# Patient Record
Sex: FEMALE | Race: WHITE | NOT HISPANIC OR LATINO | URBAN - METROPOLITAN AREA
[De-identification: names, ages, dates, MRNs, and addresses within clinical notes are randomized per-mention and may not be internally consistent; named-entity substitution may affect disease eponyms.]

---

## 2021-07-26 ENCOUNTER — LAB (OUTPATIENT)
Dept: LAB | Facility: HOSPITAL | Age: 76
End: 2021-07-26

## 2021-07-26 ENCOUNTER — OFFICE VISIT (OUTPATIENT)
Dept: ENDOCRINOLOGY | Facility: CLINIC | Age: 76
End: 2021-07-26

## 2021-07-26 VITALS
HEIGHT: 65 IN | HEART RATE: 88 BPM | SYSTOLIC BLOOD PRESSURE: 118 MMHG | DIASTOLIC BLOOD PRESSURE: 64 MMHG | OXYGEN SATURATION: 98 % | BODY MASS INDEX: 15.99 KG/M2 | WEIGHT: 96 LBS

## 2021-07-26 DIAGNOSIS — E23.0 PANHYPOPITUITARISM (HCC): Primary | ICD-10-CM

## 2021-07-26 DIAGNOSIS — E23.0 PANHYPOPITUITARISM (HCC): ICD-10-CM

## 2021-07-26 PROCEDURE — 99204 OFFICE O/P NEW MOD 45 MIN: CPT | Performed by: INTERNAL MEDICINE

## 2021-07-26 PROCEDURE — 84439 ASSAY OF FREE THYROXINE: CPT

## 2021-07-26 PROCEDURE — 80053 COMPREHEN METABOLIC PANEL: CPT

## 2021-07-26 RX ORDER — ATORVASTATIN CALCIUM 10 MG/1
10 TABLET, FILM COATED ORAL DAILY
COMMUNITY
End: 2022-08-24

## 2021-07-26 RX ORDER — OMEPRAZOLE 20 MG/1
40 CAPSULE, DELAYED RELEASE ORAL DAILY
COMMUNITY
End: 2022-02-14

## 2021-07-26 RX ORDER — LEVOTHYROXINE SODIUM 0.05 MG/1
TABLET ORAL
COMMUNITY
Start: 2021-04-22 | End: 2021-07-27 | Stop reason: SDUPTHER

## 2021-07-26 RX ORDER — HYDROCODONE BITARTRATE AND ACETAMINOPHEN 5; 325 MG/1; MG/1
1 TABLET ORAL EVERY 6 HOURS PRN
COMMUNITY
End: 2022-09-01

## 2021-07-26 RX ORDER — ERGOCALCIFEROL 1.25 MG/1
50000 CAPSULE ORAL WEEKLY
COMMUNITY

## 2021-07-26 RX ORDER — HYDROCORTISONE 10 MG/1
TABLET ORAL
COMMUNITY
Start: 2021-04-20 | End: 2021-07-27

## 2021-07-26 NOTE — PROGRESS NOTES
"     Office Note      Date: 2021  Patient Name: Anahi Tirado  MRN: [id number]  : 1945    Chief Complaint   Patient presents with   • Pituitary Problem       History of Present Illness:   Anahi Tirado is a 76 y.o. female who presents for Pituitary Problem  she is seen today as a new patient.  ------------------------------------------------  Back in the 's she had a resection of a large pituitary tumor. Followed by XRT.  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  .  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    She is not known to have DI and denies excessive urination or excessive thirst.  She struggles with her weight and fatigue.  She is so weak she can barely go.    Hair is getting thinner.      Subjective          Review of Systems:   Review of Systems   Constitutional: Positive for activity change, fatigue and unexpected weight change.   Eyes: Positive for visual disturbance.   Genitourinary: Positive for difficulty urinating.   Skin: Negative.    Neurological: Positive for weakness.   Psychiatric/Behavioral: Positive for sleep disturbance.       The following portions of the patient's history were reviewed and updated as appropriate: allergies, current medications, past family history, past medical history, past social history, past surgical history and problem list.    Objective     Visit Vitals  /64 (BP Location: Right arm, Patient Position: Sitting, Cuff Size: Adult)   Pulse 88   Ht 165.1 cm (65\")   Wt 43.5 kg (96 lb)   SpO2 98%   BMI 15.98 kg/m²       L    Physical Exam:  Physical Exam  Vitals reviewed.   Constitutional:       Comments: Abnormally thin .    HENT:      Head:      Comments: Dysmorphic right eye      Mouth/Throat:      Mouth: Mucous membranes are moist.   Eyes:      Extraocular Movements: Extraocular movements intact.      Comments: Right eye is abnormal  "   Neurological:      Mental Status: She is alert.      Comments: Limited movement of right side of face          Assessment / Plan      Assessment & Plan:  Problem List Items Addressed This Visit        Other    Panhypopituitarism (CMS/HCC) - Primary    Overview     Central hypot4. So follow t4 not tsh  Central adrenal deficiency- follow cmp, wt, bp         Current Assessment & Plan     Will check ft4 and cmp  Will adjust meds  kajal likely change to prednisone as she did better with that in past         Relevant Orders    Comprehensive Metabolic Panel    T4, Free           Leon Toscano MD   07/26/2021

## 2021-07-26 NOTE — ASSESSMENT & PLAN NOTE
Will check ft4 and cmp  Will adjust meds  kajal likely change to prednisone as she did better with that in past

## 2021-07-27 DIAGNOSIS — E23.0 PANHYPOPITUITARISM (HCC): Primary | ICD-10-CM

## 2021-07-27 LAB
ALBUMIN SERPL-MCNC: 4 G/DL (ref 3.5–5.2)
ALBUMIN/GLOB SERPL: 1.7 G/DL
ALP SERPL-CCNC: 53 U/L (ref 39–117)
ALT SERPL W P-5'-P-CCNC: 7 U/L (ref 1–33)
ANION GAP SERPL CALCULATED.3IONS-SCNC: 9.6 MMOL/L (ref 5–15)
AST SERPL-CCNC: 14 U/L (ref 1–32)
BILIRUB SERPL-MCNC: 0.5 MG/DL (ref 0–1.2)
BUN SERPL-MCNC: 12 MG/DL (ref 8–23)
BUN/CREAT SERPL: 16.7 (ref 7–25)
CALCIUM SPEC-SCNC: 8.7 MG/DL (ref 8.6–10.5)
CHLORIDE SERPL-SCNC: 103 MMOL/L (ref 98–107)
CO2 SERPL-SCNC: 28.4 MMOL/L (ref 22–29)
CREAT SERPL-MCNC: 0.72 MG/DL (ref 0.57–1)
GFR SERPL CREATININE-BSD FRML MDRD: 78 ML/MIN/1.73
GLOBULIN UR ELPH-MCNC: 2.3 GM/DL
GLUCOSE SERPL-MCNC: 86 MG/DL (ref 65–99)
POTASSIUM SERPL-SCNC: 3.8 MMOL/L (ref 3.5–5.2)
PROT SERPL-MCNC: 6.3 G/DL (ref 6–8.5)
SODIUM SERPL-SCNC: 141 MMOL/L (ref 136–145)
T4 FREE SERPL-MCNC: 1.72 NG/DL (ref 0.93–1.7)

## 2021-07-27 RX ORDER — PREDNISONE 1 MG/1
TABLET ORAL
Qty: 135 TABLET | Refills: 3 | Status: SHIPPED | OUTPATIENT
Start: 2021-07-27

## 2021-07-27 RX ORDER — LEVOTHYROXINE SODIUM 0.05 MG/1
TABLET ORAL
Qty: 72 TABLET | Refills: 3 | Status: SHIPPED | OUTPATIENT
Start: 2021-07-27 | End: 2022-05-25 | Stop reason: SDUPTHER

## 2021-11-03 ENCOUNTER — OFFICE VISIT (OUTPATIENT)
Dept: ENDOCRINOLOGY | Facility: CLINIC | Age: 76
End: 2021-11-03

## 2021-11-03 VITALS
SYSTOLIC BLOOD PRESSURE: 128 MMHG | HEIGHT: 65 IN | BODY MASS INDEX: 18.49 KG/M2 | DIASTOLIC BLOOD PRESSURE: 82 MMHG | OXYGEN SATURATION: 97 % | WEIGHT: 111 LBS | HEART RATE: 85 BPM

## 2021-11-03 DIAGNOSIS — E23.0 PANHYPOPITUITARISM (HCC): ICD-10-CM

## 2021-11-03 PROCEDURE — 99213 OFFICE O/P EST LOW 20 MIN: CPT | Performed by: INTERNAL MEDICINE

## 2021-11-03 NOTE — ASSESSMENT & PLAN NOTE
She is doing better. I don't need any labs today  Her recovery is fragile and we need to keep a close eye on her so will recheck in a couple of months

## 2021-11-03 NOTE — PROGRESS NOTES
"     Office Note      Date: 2021  Patient Name: Anahi Tirado  MRN: [id number]  : 1945    Chief Complaint   Patient presents with   • panhypopituitarism       History of Present Illness:   Anahi Tirado is a 76 y.o. female who presents for panhypopituitarism  last time, I changed her back to prednisone which seems to work better for her. She has gained 15 pounds and is pleased with that- she had lost 50 pounds and felt really poorly  There has been no change to her medical history.  Some mornings she wakes up nauseated.  Her pcp increased her prilosec to 40 bid.  -----------------------  Her bp has been better and overall she feels better     Subjective          Review of Systems:   Review of Systems   Constitutional: Negative.        The following portions of the patient's history were reviewed and updated as appropriate: allergies, current medications, past family history, past medical history, past social history, past surgical history and problem list.    Objective     Visit Vitals  /82 (BP Location: Left arm, Patient Position: Sitting, Cuff Size: Adult)   Pulse 85   Ht 165.1 cm (65\")   Wt 50.3 kg (111 lb)   SpO2 97%   BMI 18.47 kg/m²       Labs:    CBC w/DIFF  No results found for: WBC, RBC, HGB, HCT, MCV, MCH, MCHC, RDW, RDWSD, MPV, PLT, NEUTRORELPCT, LYMPHORELPCT, MONORELPCT, EOSRELPCT, BASORELPCT, AUTOIGPER, NEUTROABS, LYMPHSABS, MONOSABS, EOSABS, BASOSABS, AUTOIGNUM, NRBC    T4  Free T4   Date Value Ref Range Status   2021 1.72 (H) 0.93 - 1.70 ng/dL Final       TSH  No results found for: TSHBASE     Physical Exam:  Physical Exam  Vitals reviewed.   Constitutional:       Appearance: Normal appearance.   Neurological:      Mental Status: She is alert.         Assessment / Plan      Assessment & Plan:  Problem List Items Addressed This Visit        Other    Panhypopituitarism (HCC)    Overview     Central hypot4. So follow t4 not tsh  Central adrenal deficiency- follow cmp, wt, " bp         Current Assessment & Plan     She is doing better. I don't need any labs today  Her recovery is fragile and we need to keep a close eye on her so will recheck in a couple of months          Relevant Medications    levothyroxine (SYNTHROID, LEVOTHROID) 50 MCG tablet    predniSONE (DELTASONE) 5 MG tablet           Leon Toscano MD   11/03/2021

## 2022-02-14 ENCOUNTER — LAB (OUTPATIENT)
Dept: LAB | Facility: HOSPITAL | Age: 77
End: 2022-02-14

## 2022-02-14 ENCOUNTER — OFFICE VISIT (OUTPATIENT)
Dept: ENDOCRINOLOGY | Facility: CLINIC | Age: 77
End: 2022-02-14

## 2022-02-14 VITALS
BODY MASS INDEX: 19.49 KG/M2 | HEART RATE: 74 BPM | WEIGHT: 117 LBS | HEIGHT: 65 IN | SYSTOLIC BLOOD PRESSURE: 110 MMHG | OXYGEN SATURATION: 97 % | DIASTOLIC BLOOD PRESSURE: 61 MMHG

## 2022-02-14 DIAGNOSIS — E23.0 PANHYPOPITUITARISM: ICD-10-CM

## 2022-02-14 DIAGNOSIS — E23.0 PANHYPOPITUITARISM: Primary | ICD-10-CM

## 2022-02-14 PROCEDURE — 84439 ASSAY OF FREE THYROXINE: CPT

## 2022-02-14 PROCEDURE — 99213 OFFICE O/P EST LOW 20 MIN: CPT | Performed by: INTERNAL MEDICINE

## 2022-02-14 PROCEDURE — 80053 COMPREHEN METABOLIC PANEL: CPT

## 2022-02-14 RX ORDER — ONDANSETRON 4 MG/1
TABLET, ORALLY DISINTEGRATING ORAL
COMMUNITY
Start: 2022-02-08 | End: 2022-09-01

## 2022-02-14 RX ORDER — POTASSIUM CHLORIDE 20 MEQ/1
TABLET, EXTENDED RELEASE ORAL
COMMUNITY
Start: 2022-02-08 | End: 2022-09-01

## 2022-02-14 RX ORDER — LOPERAMIDE HYDROCHLORIDE 2 MG/1
CAPSULE ORAL
COMMUNITY
Start: 2022-02-08 | End: 2022-09-01

## 2022-02-14 NOTE — PROGRESS NOTES
"     Office Note      Date: 2022  Patient Name: Anahi Tirado  MRN: [id number]  : 1945    Cc: pituitary follow up   History of Present Illness:   Anahi Tirado is a 76 y.o. female who presents for follow up of central pituitary disease from pituitary surgery in the 's  She went to the er with bad diarrhea last week  She had low K then as well.   She had been feeling better but now notes she is more week.  ----  She has not had excessive thirst.    Subjective        Review of Systems:   Review of Systems   Constitutional: Negative.    HENT: Negative.    Eyes: Negative.    Respiratory: Negative.        The following portions of the patient's history were reviewed and updated as appropriate: allergies, current medications, past family history, past medical history, past social history, past surgical history and problem list.    Objective     Visit Vitals  /61   Pulse 74   Ht 165.1 cm (65\")   Wt 53.1 kg (117 lb)   SpO2 97%   BMI 19.47 kg/m²       Labs:    CBC w/DIFF  No results found for: WBC, RBC, HGB, HCT, MCV, MCH, MCHC, RDW, RDWSD, MPV, PLT, NEUTRORELPCT, LYMPHORELPCT, MONORELPCT, EOSRELPCT, BASORELPCT, AUTOIGPER, NEUTROABS, LYMPHSABS, MONOSABS, EOSABS, BASOSABS, AUTOIGNUM, NRBC    T4  Free T4   Date Value Ref Range Status   2021 1.72 (H) 0.93 - 1.70 ng/dL Final       TSH  No results found for: TSHBASE     Physical Exam:  Physical Exam  Vitals reviewed.   Constitutional:       Appearance: Normal appearance.   HENT:      Head: Normocephalic and atraumatic.   Musculoskeletal:      Comments: Bruising left antecubital fossae   Neurological:      Mental Status: She is alert.   Psychiatric:         Mood and Affect: Mood normal.         Thought Content: Thought content normal.         Judgment: Judgment normal.         Assessment / Plan      Assessment & Plan:  Problem List Items Addressed This Visit        Other    Panhypopituitarism (HCC) - Primary    Overview     Central hypot4. 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 Except for the recent diarrhea.   Will check free t4 and recheck her potassium         Relevant Medications    levothyroxine (SYNTHROID, LEVOTHROID) 50 MCG tablet    predniSONE (DELTASONE) 5 MG tablet    Other Relevant Orders    T4, Free    Comprehensive Metabolic Panel           Leon Toscano MD   02/14/2022

## 2022-02-15 LAB
ALBUMIN SERPL-MCNC: 4 G/DL (ref 3.5–5.2)
ALBUMIN/GLOB SERPL: 1.9 G/DL
ALP SERPL-CCNC: 54 U/L (ref 39–117)
ALT SERPL W P-5'-P-CCNC: 9 U/L (ref 1–33)
ANION GAP SERPL CALCULATED.3IONS-SCNC: 11 MMOL/L (ref 5–15)
AST SERPL-CCNC: 16 U/L (ref 1–32)
BILIRUB SERPL-MCNC: 0.4 MG/DL (ref 0–1.2)
BUN SERPL-MCNC: 11 MG/DL (ref 8–23)
BUN/CREAT SERPL: 11.1 (ref 7–25)
CALCIUM SPEC-SCNC: 8.8 MG/DL (ref 8.6–10.5)
CHLORIDE SERPL-SCNC: 106 MMOL/L (ref 98–107)
CO2 SERPL-SCNC: 25 MMOL/L (ref 22–29)
CREAT SERPL-MCNC: 0.99 MG/DL (ref 0.57–1)
GFR SERPL CREATININE-BSD FRML MDRD: 54 ML/MIN/1.73
GLOBULIN UR ELPH-MCNC: 2.1 GM/DL
GLUCOSE SERPL-MCNC: 89 MG/DL (ref 65–99)
POTASSIUM SERPL-SCNC: 4.4 MMOL/L (ref 3.5–5.2)
PROT SERPL-MCNC: 6.1 G/DL (ref 6–8.5)
SODIUM SERPL-SCNC: 142 MMOL/L (ref 136–145)
T4 FREE SERPL-MCNC: 1.45 NG/DL (ref 0.93–1.7)

## 2022-03-21 ENCOUNTER — TELEPHONE (OUTPATIENT)
Dept: ENDOCRINOLOGY | Facility: CLINIC | Age: 77
End: 2022-03-21

## 2022-03-21 NOTE — TELEPHONE ENCOUNTER
PATIENT WOULD LIKE A CALL BACK TO DISCUSS DOSAGE ON SYNTHROID. SHE STATES BACK IN 1995 THE DOSAGE OF SYNTHROID WAS 0.75 MCG. SHE IS CURRENTLY TAKING 50 MCG. PATIENTS NUMBER [phone]

## 2022-03-22 NOTE — TELEPHONE ENCOUNTER
Spoke with patient.  She states that Dr. Toscano wanted to know what dose she had been on previously.  She states she was originally on 0.75mcg back in 1995.

## 2022-05-25 DIAGNOSIS — E23.0 PANHYPOPITUITARISM: ICD-10-CM

## 2022-05-25 RX ORDER — LEVOTHYROXINE SODIUM 0.05 MG/1
TABLET ORAL
Qty: 72 TABLET | Refills: 1 | Status: SHIPPED | OUTPATIENT
Start: 2022-05-25

## 2022-08-24 ENCOUNTER — OFFICE VISIT (OUTPATIENT)
Dept: ENDOCRINOLOGY | Facility: CLINIC | Age: 77
End: 2022-08-24

## 2022-08-24 ENCOUNTER — LAB (OUTPATIENT)
Dept: LAB | Facility: HOSPITAL | Age: 77
End: 2022-08-24

## 2022-08-24 VITALS
OXYGEN SATURATION: 98 % | HEIGHT: 65 IN | HEART RATE: 89 BPM | DIASTOLIC BLOOD PRESSURE: 64 MMHG | WEIGHT: 122 LBS | BODY MASS INDEX: 20.33 KG/M2 | SYSTOLIC BLOOD PRESSURE: 110 MMHG

## 2022-08-24 DIAGNOSIS — E23.0 PANHYPOPITUITARISM: ICD-10-CM

## 2022-08-24 DIAGNOSIS — E23.0 PANHYPOPITUITARISM: Primary | ICD-10-CM

## 2022-08-24 DIAGNOSIS — D50.8 OTHER IRON DEFICIENCY ANEMIA: ICD-10-CM

## 2022-08-24 PROBLEM — D50.9 IRON DEFICIENCY ANEMIA: Status: ACTIVE | Noted: 2022-08-24

## 2022-08-24 LAB
ALBUMIN SERPL-MCNC: 3.6 G/DL (ref 3.5–5.2)
ALBUMIN/GLOB SERPL: 1.7 G/DL
ALP SERPL-CCNC: 54 U/L (ref 39–117)
ALT SERPL W P-5'-P-CCNC: 40 U/L (ref 1–33)
ANION GAP SERPL CALCULATED.3IONS-SCNC: 13.7 MMOL/L (ref 5–15)
AST SERPL-CCNC: 36 U/L (ref 1–32)
BILIRUB SERPL-MCNC: 0.5 MG/DL (ref 0–1.2)
BUN SERPL-MCNC: 16 MG/DL (ref 8–23)
BUN/CREAT SERPL: 16.5 (ref 7–25)
CALCIUM SPEC-SCNC: 8.8 MG/DL (ref 8.6–10.5)
CHLORIDE SERPL-SCNC: 103 MMOL/L (ref 98–107)
CO2 SERPL-SCNC: 27.3 MMOL/L (ref 22–29)
CREAT SERPL-MCNC: 0.97 MG/DL (ref 0.57–1)
DEPRECATED RDW RBC AUTO: 49.7 FL (ref 37–54)
EGFRCR SERPLBLD CKD-EPI 2021: 59.6 ML/MIN/1.73
ERYTHROCYTE [DISTWIDTH] IN BLOOD BY AUTOMATED COUNT: 15.3 % (ref 12.3–15.4)
GLOBULIN UR ELPH-MCNC: 2.1 GM/DL
GLUCOSE SERPL-MCNC: 196 MG/DL (ref 65–99)
HCT VFR BLD AUTO: 34.3 % (ref 34–46.6)
HGB BLD-MCNC: 11.1 G/DL (ref 12–15.9)
MCH RBC QN AUTO: 29.1 PG (ref 26.6–33)
MCHC RBC AUTO-ENTMCNC: 32.4 G/DL (ref 31.5–35.7)
MCV RBC AUTO: 89.8 FL (ref 79–97)
PLATELET # BLD AUTO: 222 10*3/MM3 (ref 140–450)
PMV BLD AUTO: 9.7 FL (ref 6–12)
POTASSIUM SERPL-SCNC: 3.4 MMOL/L (ref 3.5–5.2)
PROT SERPL-MCNC: 5.7 G/DL (ref 6–8.5)
RBC # BLD AUTO: 3.82 10*6/MM3 (ref 3.77–5.28)
SODIUM SERPL-SCNC: 144 MMOL/L (ref 136–145)
WBC NRBC COR # BLD: 10.13 10*3/MM3 (ref 3.4–10.8)

## 2022-08-24 PROCEDURE — 80053 COMPREHEN METABOLIC PANEL: CPT

## 2022-08-24 PROCEDURE — 84439 ASSAY OF FREE THYROXINE: CPT

## 2022-08-24 PROCEDURE — 99214 OFFICE O/P EST MOD 30 MIN: CPT | Performed by: INTERNAL MEDICINE

## 2022-08-24 PROCEDURE — 85027 COMPLETE CBC AUTOMATED: CPT

## 2022-08-24 PROCEDURE — 36415 COLL VENOUS BLD VENIPUNCTURE: CPT

## 2022-08-24 RX ORDER — ATORVASTATIN CALCIUM 20 MG/1
TABLET, FILM COATED ORAL
COMMUNITY
Start: 2022-08-19

## 2022-08-24 NOTE — PROGRESS NOTES
"     Office Note      Date: 2022  Patient Name: Anahi Tirado  MRN: [id number]  : 1945    Chief Complaint   Patient presents with   • panhypopituitarism       History of Present Illness:   Anahi Tirado is a 77 y.o. female who presents for panhypopituitarism  she is on levothyroxine and chronic steroids and is here for routine follow up.  --------------------  She notes lack of energy.. this has been going on for a while.  She is not having chest pain or shortness of breath.   Weight has been climbing slowly.  ---------------------  She notes decreased exercise tolerance.    She reports that none of this is new and has been this way a long time         Subjective      .    Review of Systems:   Review of Systems   Constitutional: Positive for fatigue and unexpected weight change.   Eyes: Positive for visual disturbance.   Endocrine: Negative for cold intolerance and heat intolerance.   Psychiatric/Behavioral: Negative for sleep disturbance.       The following portions of the patient's history were reviewed and updated as appropriate: allergies, current medications, past family history, past medical history, past social history, past surgical history and problem list.    Objective     Visit Vitals  /64   Pulse 89   Ht 165.1 cm (65\")   Wt 55.3 kg (122 lb)   SpO2 98%   BMI 20.30 kg/m²       Labs:    CBC w/DIFF  No results found for: WBC, RBC, HGB, HCT, MCV, MCH, MCHC, RDW, RDWSD, MPV, PLT, NEUTRORELPCT, LYMPHORELPCT, MONORELPCT, EOSRELPCT, BASORELPCT, AUTOIGPER, NEUTROABS, LYMPHSABS, MONOSABS, EOSABS, BASOSABS, AUTOIGNUM, NRBC    T4  Free T4   Date Value Ref Range Status   2022 1.45 0.93 - 1.70 ng/dL Final       TSH  No results found for: TSHBASE     Physical Exam:  Physical Exam  Vitals reviewed.   Constitutional:       Appearance: Normal appearance.      Comments: pale   Neurological:      Mental Status: She is alert.   Psychiatric:         Mood and Affect: Mood normal.         Behavior: " Behavior normal.         Thought Content: Thought content normal.         Judgment: Judgment normal.         Assessment / Plan      Assessment & Plan:  Problem List Items Addressed This Visit        Other    Panhypopituitarism (HCC) - Primary    Overview     Central hypot4. 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          Relevant Medications    predniSONE (DELTASONE) 5 MG tablet    levothyroxine (SYNTHROID, LEVOTHROID) 50 MCG tablet    Other Relevant Orders    Comprehensive Metabolic Panel    T4, Free    Iron deficiency anemia    Relevant Orders    CBC (No Diff)           Leon Toscano MD   08/24/2022

## 2022-08-25 DIAGNOSIS — R73.9 HYPERGLYCEMIA: Primary | ICD-10-CM

## 2022-08-25 LAB — T4 FREE SERPL-MCNC: 1.29 NG/DL (ref 0.93–1.7)

## 2022-08-30 ENCOUNTER — TELEPHONE (OUTPATIENT)
Dept: ENDOCRINOLOGY | Facility: CLINIC | Age: 77
End: 2022-08-30

## 2022-08-30 NOTE — TELEPHONE ENCOUNTER
Pt called with the fax number  For dr belcher's office fax [phone]    We need to fax over the lab order for her a1c

## 2022-09-01 ENCOUNTER — TELEPHONE (OUTPATIENT)
Dept: ENDOCRINOLOGY | Facility: CLINIC | Age: 77
End: 2022-09-01

## 2022-09-01 NOTE — TELEPHONE ENCOUNTER
Spoke to pt-adjusted med list to meds she is taking ( didn't know her meds at her appt)  She is concerned about her bs's being high when it was checked.  She doesn't have any teeth and has been eating sweets, icing etc.  Before we start her on meds she would like to get her teeth and eat better.  You can sign this encounter after you read it  john

## 2022-09-01 NOTE — TELEPHONE ENCOUNTER
CALL BACK [phone]    PATIENT STATES THAT SHE NEEDS TO SPEAK WITH CLINIC STAFF REGARDING HER MEDICATION LIST. SHE DIDN'T KNOW WHAT MEDICATION SHE WAS CURRENTLY TAKING DURING HER LAST APPT BUT HAS NOW WENT THROUGH HER MEDICATION AND WOULD LIKE TO MAKE ANY UPDATES NEEDED IN HER CHART.

## 2023-02-24 ENCOUNTER — OFFICE VISIT (OUTPATIENT)
Dept: ENDOCRINOLOGY | Facility: CLINIC | Age: 78
End: 2023-02-24
Payer: MEDICARE

## 2023-02-24 VITALS
HEIGHT: 65 IN | SYSTOLIC BLOOD PRESSURE: 118 MMHG | WEIGHT: 123 LBS | OXYGEN SATURATION: 98 % | HEART RATE: 89 BPM | BODY MASS INDEX: 20.49 KG/M2 | DIASTOLIC BLOOD PRESSURE: 78 MMHG

## 2023-02-24 DIAGNOSIS — E23.0 PANHYPOPITUITARISM: Primary | ICD-10-CM

## 2023-02-24 PROCEDURE — 36415 COLL VENOUS BLD VENIPUNCTURE: CPT | Performed by: INTERNAL MEDICINE

## 2023-02-24 PROCEDURE — 99213 OFFICE O/P EST LOW 20 MIN: CPT | Performed by: INTERNAL MEDICINE

## 2023-02-24 PROCEDURE — 84439 ASSAY OF FREE THYROXINE: CPT | Performed by: INTERNAL MEDICINE

## 2023-02-24 PROCEDURE — 80053 COMPREHEN METABOLIC PANEL: CPT | Performed by: INTERNAL MEDICINE

## 2023-02-24 RX ORDER — GABAPENTIN 100 MG/1
100 CAPSULE ORAL DAILY
COMMUNITY
Start: 2022-12-28

## 2023-02-24 NOTE — PROGRESS NOTES
"     Office Note      Date: 2023  Patient Name: Anahi Tirado  MRN: [id number]  : 1945    Chief Complaint   Patient presents with   • Panhypopituitarism       History of Present Illness:   Anahi Tirado is a 77 y.o. female who presents for  Panhypopit.  Last time, her blood sugar was high. I had her get an a1c and it was 5.8 as she recalls. She  Cut back on  carbs and the dizzy spells stopped.     She is still taking thryoid and prednisone   Changes in health since last visit: none . Last eye exam up to date.    Subjective              Review of Systems:   Review of Systems   Constitutional: Negative.  Negative for fatigue and unexpected weight change.   HENT: Negative.    Eyes: Positive for visual disturbance.   Respiratory: Negative.    Endocrine: Positive for polyuria. Negative for polydipsia.   Musculoskeletal: Positive for back pain.   Neurological: Negative for dizziness and syncope.       The following portions of the patient's history were reviewed and updated as appropriate: allergies, current medications, past family history, past medical history, past social history, past surgical history and problem list.    Objective     Visit Vitals  /78   Pulse 89   Ht 165.1 cm (65\")   Wt 55.8 kg (123 lb)   SpO2 98%   BMI 20.47 kg/m²           Physical Exam:  Physical Exam  Vitals reviewed.   Constitutional:       Appearance: Normal appearance.   HENT:      Head: Normocephalic and atraumatic.   Eyes:      Extraocular Movements: Extraocular movements intact.      Comments: Almost Sightless right eye   Musculoskeletal:      Comments: No tremor   Neurological:      Mental Status: She is alert.   Psychiatric:         Mood and Affect: Mood normal.         Behavior: Behavior normal.         Thought Content: Thought content normal.         Judgment: Judgment normal.          Assessment / Plan      Assessment & Plan:  Problem List Items Addressed This Visit        Other    Panhypopituitarism (HCC) - " Primary    Overview     Central hypot4. 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  Will get labs to confirm          Relevant Medications    predniSONE (DELTASONE) 5 MG tablet    levothyroxine (SYNTHROID, LEVOTHROID) 50 MCG tablet    Other Relevant Orders    T4, Free    Comprehensive Metabolic Panel        Leon Toscano MD   02/24/2023

## 2023-02-25 LAB
ALBUMIN SERPL-MCNC: 4.1 G/DL (ref 3.5–5.2)
ALBUMIN/GLOB SERPL: 1.8 G/DL
ALP SERPL-CCNC: 136 U/L (ref 39–117)
ALT SERPL W P-5'-P-CCNC: 19 U/L (ref 1–33)
ANION GAP SERPL CALCULATED.3IONS-SCNC: 8.1 MMOL/L (ref 5–15)
AST SERPL-CCNC: 29 U/L (ref 1–32)
BILIRUB SERPL-MCNC: 0.3 MG/DL (ref 0–1.2)
BUN SERPL-MCNC: 12 MG/DL (ref 8–23)
BUN/CREAT SERPL: 13.6 (ref 7–25)
CALCIUM SPEC-SCNC: 8.9 MG/DL (ref 8.6–10.5)
CHLORIDE SERPL-SCNC: 103 MMOL/L (ref 98–107)
CO2 SERPL-SCNC: 29.9 MMOL/L (ref 22–29)
CREAT SERPL-MCNC: 0.88 MG/DL (ref 0.57–1)
EGFRCR SERPLBLD CKD-EPI 2021: 66.9 ML/MIN/1.73
GLOBULIN UR ELPH-MCNC: 2.3 GM/DL
GLUCOSE SERPL-MCNC: 71 MG/DL (ref 65–99)
POTASSIUM SERPL-SCNC: 3.8 MMOL/L (ref 3.5–5.2)
PROT SERPL-MCNC: 6.4 G/DL (ref 6–8.5)
SODIUM SERPL-SCNC: 141 MMOL/L (ref 136–145)
T4 FREE SERPL-MCNC: 0.92 NG/DL (ref 0.93–1.7)

## 2023-09-20 ENCOUNTER — OFFICE VISIT (OUTPATIENT)
Dept: ENDOCRINOLOGY | Facility: CLINIC | Age: 78
End: 2023-09-20
Payer: MEDICARE

## 2023-09-20 VITALS
HEART RATE: 89 BPM | OXYGEN SATURATION: 97 % | SYSTOLIC BLOOD PRESSURE: 120 MMHG | WEIGHT: 111 LBS | DIASTOLIC BLOOD PRESSURE: 62 MMHG | BODY MASS INDEX: 18.49 KG/M2 | HEIGHT: 65 IN

## 2023-09-20 DIAGNOSIS — R06.09 DYSPNEA ON EXERTION: ICD-10-CM

## 2023-09-20 DIAGNOSIS — E23.0 PANHYPOPITUITARISM: Primary | ICD-10-CM

## 2023-09-20 LAB
ALBUMIN SERPL-MCNC: 3.5 G/DL (ref 3.5–5.2)
ALBUMIN/GLOB SERPL: 1.5 G/DL
ALP SERPL-CCNC: 45 U/L (ref 39–117)
ALT SERPL W P-5'-P-CCNC: 8 U/L (ref 1–33)
ANION GAP SERPL CALCULATED.3IONS-SCNC: 10.6 MMOL/L (ref 5–15)
AST SERPL-CCNC: 17 U/L (ref 1–32)
BILIRUB SERPL-MCNC: 0.4 MG/DL (ref 0–1.2)
BUN SERPL-MCNC: 23 MG/DL (ref 8–23)
BUN/CREAT SERPL: 28.8 (ref 7–25)
CALCIUM SPEC-SCNC: 9 MG/DL (ref 8.6–10.5)
CHLORIDE SERPL-SCNC: 104 MMOL/L (ref 98–107)
CO2 SERPL-SCNC: 25.4 MMOL/L (ref 22–29)
CREAT SERPL-MCNC: 0.8 MG/DL (ref 0.57–1)
EGFRCR SERPLBLD CKD-EPI 2021: 74.6 ML/MIN/1.73
GLOBULIN UR ELPH-MCNC: 2.3 GM/DL
GLUCOSE SERPL-MCNC: 104 MG/DL (ref 65–99)
POTASSIUM SERPL-SCNC: 3.7 MMOL/L (ref 3.5–5.2)
PROT SERPL-MCNC: 5.8 G/DL (ref 6–8.5)
SODIUM SERPL-SCNC: 140 MMOL/L (ref 136–145)
T4 FREE SERPL-MCNC: 1.45 NG/DL (ref 0.93–1.7)

## 2023-09-20 PROCEDURE — 80053 COMPREHEN METABOLIC PANEL: CPT | Performed by: INTERNAL MEDICINE

## 2023-09-20 PROCEDURE — 84439 ASSAY OF FREE THYROXINE: CPT | Performed by: INTERNAL MEDICINE

## 2023-09-20 PROCEDURE — 99214 OFFICE O/P EST MOD 30 MIN: CPT | Performed by: INTERNAL MEDICINE

## 2023-09-20 PROCEDURE — 36415 COLL VENOUS BLD VENIPUNCTURE: CPT | Performed by: INTERNAL MEDICINE

## 2023-09-20 NOTE — ASSESSMENT & PLAN NOTE
She notes increasing fatigue with exertion such that she is not able to even accomplish adls. She has associated pain up in to her neck. The weakness is relieved by rest.  I am strongly suspicious that this is anginal equivalent and will arrange for her to see a cardiologist

## 2023-09-20 NOTE — PROGRESS NOTES
"     Office Note      Date: 2023  Patient Name: Anahi Tirado  MRN: [id number]  : 1945    Chief Complaint   Patient presents with    Thyroid Problem     Panhypopituitarism         History of Present Illness:   Anahi Tirado is a 78 y.o. female who presents for Thyroid Problem (Panhypopituitarism/)  .   Current rx: t4 and prednisone     Changes in history:for the last month she has not felt right.  She is unab le to do anything. She has no strength. She gets really weak.  She has lost weight. She has trouble getting her breath when she has any exertion at all.  She has not had chest pain or chest pressure.  She gets neck pain with this         Subjective          Review of Systems:   Review of Systems   Constitutional:  Positive for fatigue and unexpected weight change.   Musculoskeletal:  Positive for neck pain.     The following portions of the patient's history were reviewed and updated as appropriate: allergies, current medications, past family history, past medical history, past social history, past surgical history, and problem list.    Objective     Visit Vitals  /62 (BP Location: Left arm, Patient Position: Sitting, Cuff Size: Adult)   Pulse 89   Ht 165.1 cm (65\")   Wt 50.3 kg (111 lb)   SpO2 97%   BMI 18.47 kg/m²           Physical Exam:  Physical Exam  Vitals reviewed.   Constitutional:       Appearance: Normal appearance.      Comments: Very thin   HENT:      Head: Normocephalic.   Eyes:      Extraocular Movements: Extraocular movements intact.   Cardiovascular:      Rate and Rhythm: Normal rate and regular rhythm.      Heart sounds: Normal heart sounds.   Pulmonary:      Effort: Pulmonary effort is normal. No respiratory distress.   Lymphadenopathy:      Cervical: No cervical adenopathy.   Neurological:      Mental Status: She is alert.   Psychiatric:         Mood and Affect: Mood normal.         Thought Content: Thought content normal.         Judgment: Judgment normal. "     Assessment / Plan      Assessment & Plan:  Problem List Items Addressed This Visit          Other    Panhypopituitarism - Primary    Overview     Central hypot4. 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 No real reason for it to change. Will check appropriate labs and adjust as needed          Relevant Medications    predniSONE (DELTASONE) 5 MG tablet    levothyroxine (SYNTHROID, LEVOTHROID) 50 MCG tablet    Other Relevant Orders    Comprehensive Metabolic Panel    T4, Free    Dyspnea on exertion    Current Assessment & Plan     She notes increasing fatigue with exertion such that she is not able to even accomplish adls. She has associated pain up in to her neck. The weakness is relieved by rest.  I am strongly suspicious that this is anginal equivalent and will arrange for her to see a cardiologist         Relevant Orders    Ambulatory Referral to Cardiology        Leon Toscano MD   09/20/2023

## 2023-09-20 NOTE — ASSESSMENT & PLAN NOTE
This has been stable for years. No real reason for it to change. Will check appropriate labs and adjust as needed

## 2023-09-25 ENCOUNTER — OFFICE VISIT (OUTPATIENT)
Dept: CARDIOLOGY | Facility: CLINIC | Age: 78
End: 2023-09-25

## 2023-09-25 VITALS
SYSTOLIC BLOOD PRESSURE: 124 MMHG | HEIGHT: 65 IN | WEIGHT: 111 LBS | BODY MASS INDEX: 18.49 KG/M2 | OXYGEN SATURATION: 99 % | HEART RATE: 80 BPM | DIASTOLIC BLOOD PRESSURE: 62 MMHG

## 2023-09-25 DIAGNOSIS — R07.2 PRECORDIAL PAIN: Primary | ICD-10-CM

## 2023-09-25 PROBLEM — M54.9 BACK PAIN: Status: ACTIVE | Noted: 2023-09-25

## 2023-09-25 PROBLEM — E78.5 HYPERLIPIDEMIA: Status: ACTIVE | Noted: 2023-09-25

## 2023-09-25 PROCEDURE — 99204 OFFICE O/P NEW MOD 45 MIN: CPT | Performed by: INTERNAL MEDICINE

## 2023-09-25 RX ORDER — METOPROLOL SUCCINATE 25 MG/1
TABLET, EXTENDED RELEASE ORAL
Qty: 10 TABLET | Refills: 0 | Status: SHIPPED | OUTPATIENT
Start: 2023-09-25

## 2023-09-25 NOTE — PROGRESS NOTES
"Chief Complaint  Shortness of Breath (New Patient) and Fatigue      Subjective   History of Present Illness    Problem List  -S/p pitutiary tumor removal resulting in pan hypo pituitarism.    -HLD  -chronic back pain  -fatigue/dyspnea  -EKG non specific changes    Ms. Domingo is a 78 year old lady with above hx.  Over past few years she has been having worsening back pain that radiates up and down her spine.  She has injections prior and was under anaesthesia and apparently had back reaction (getting records).  Her pain continues to worsen.  She occasionally gets radiation to her jaw.  The pain is not making her stop walking and sit down after 20-30 steps.  She has occasional dyspnea during these episodes.  Occasional chest tightness but no jonathan chest pain.  She is now having difficulity doing ADLS because the pain.  ROS negative except for the above.         Objective   Vital Signs:  Vitals:    09/25/23 1401   BP: 124/62   Pulse: 80   SpO2: 99%     Estimated body mass index is 18.47 kg/m² as calculated from the following:    Height as of this encounter: 165.1 cm (65\").    Weight as of this encounter: 50.3 kg (111 lb).       Physical Exam  HENT:      Head: Normocephalic.   Eyes:      Extraocular Movements: Extraocular movements intact.   Cardiovascular:      Rate and Rhythm: Normal rate and regular rhythm.      Heart sounds: No murmur heard.    No gallop.   Pulmonary:      Breath sounds: Normal breath sounds.   Abdominal:      Palpations: Abdomen is soft.   Musculoskeletal:      Right lower leg: No edema.      Left lower leg: No edema.   Skin:     General: Skin is warm and dry.   Neurological:      General: No focal deficit present.      Mental Status: She is alert.   Psychiatric:         Mood and Affect: Mood normal.             Assessment   -S/p pitutiary tumor removal resulting in pan hypo pituitarism.    -HLD  -chronic back pain  -fatigue/dyspnea  -EKG non specific changes    Plan   -While most of her symptoms " seem related to her back pain her decrease in walking is quite concerning and long with some non specific dyspnea and chest tightness.  Will get coronary CT angiogram to better evaluate.  Will add metoprolol for scan and see back in 2 months.      Return in about 2 months (around 11/25/2023).  Juan Daniel Cha MD  09/25/2023 14:38 EDT

## 2023-11-16 ENCOUNTER — TELEPHONE (OUTPATIENT)
Dept: INFUSION THERAPY | Facility: HOSPITAL | Age: 78
End: 2023-11-16
Payer: MEDICARE

## 2023-11-16 NOTE — TELEPHONE ENCOUNTER
Pt contacted as pre-procedure phone call prior to planned CT angiogram coronary for 11/17. Reviewed with patient arrival time, location, nothing to eat or drink by mouth 2 hours prior to arrival, no caffeine after midnight, okay to take medications morning of procedure with a small sip of water,  needed, reviewed procedure instructions and allowed time for questions.

## 2024-02-15 ENCOUNTER — TELEPHONE (OUTPATIENT)
Dept: INFUSION THERAPY | Facility: HOSPITAL | Age: 79
End: 2024-02-15
Payer: MEDICARE

## 2024-02-16 ENCOUNTER — HOSPITAL ENCOUNTER (OUTPATIENT)
Dept: CT IMAGING | Facility: HOSPITAL | Age: 79
Discharge: HOME | End: 2024-02-16
Payer: MEDICARE

## 2024-02-16 ENCOUNTER — TELEPHONE (OUTPATIENT)
Dept: CARDIOLOGY | Facility: CLINIC | Age: 79
End: 2024-02-16

## 2024-02-16 VITALS
OXYGEN SATURATION: 96 % | HEART RATE: 56 BPM | HEIGHT: 65 IN | RESPIRATION RATE: 16 BRPM | DIASTOLIC BLOOD PRESSURE: 58 MMHG | BODY MASS INDEX: 16.66 KG/M2 | WEIGHT: 100 LBS | SYSTOLIC BLOOD PRESSURE: 114 MMHG | TEMPERATURE: 96.9 F

## 2024-02-16 DIAGNOSIS — R93.1 ABNORMAL FINDINGS ON DIAGNOSTIC IMAGING OF HEART AND CORONARY CIRCULATION: ICD-10-CM

## 2024-02-16 DIAGNOSIS — R07.2 PRECORDIAL PAIN: ICD-10-CM

## 2024-02-16 DIAGNOSIS — R06.09 DYSPNEA ON EXERTION: Primary | ICD-10-CM

## 2024-02-16 PROCEDURE — A9270 NON-COVERED ITEM OR SERVICE: HCPCS

## 2024-02-16 PROCEDURE — 75574 CT ANGIO HRT W/3D IMAGE: CPT

## 2024-02-16 PROCEDURE — 25510000001 IOPAMIDOL PER 1 ML: Performed by: INTERNAL MEDICINE

## 2024-02-16 PROCEDURE — 63710000001 NITROGLYCERIN 0.4 MG/SPRAY SOLUTION 4.9 G BOTTLE

## 2024-02-16 RX ORDER — METOPROLOL TARTRATE 1 MG/ML
5 INJECTION, SOLUTION INTRAVENOUS
Status: DISCONTINUED | OUTPATIENT
Start: 2024-02-16 | End: 2024-02-17 | Stop reason: HOSPADM

## 2024-02-16 RX ORDER — NITROGLYCERIN 400 UG/1
SPRAY ORAL
Status: COMPLETED
Start: 2024-02-16 | End: 2024-02-16

## 2024-02-16 RX ORDER — NITROGLYCERIN 400 UG/1
2 SPRAY ORAL
Status: COMPLETED | OUTPATIENT
Start: 2024-02-16 | End: 2024-02-16

## 2024-02-16 RX ORDER — METOPROLOL TARTRATE 50 MG/1
50 TABLET, FILM COATED ORAL
Status: DISCONTINUED | OUTPATIENT
Start: 2024-02-16 | End: 2024-02-17 | Stop reason: HOSPADM

## 2024-02-16 RX ORDER — METOPROLOL TARTRATE 100 MG/1
100 TABLET ORAL ONCE
Status: DISCONTINUED | OUTPATIENT
Start: 2024-02-16 | End: 2024-02-17 | Stop reason: HOSPADM

## 2024-02-16 RX ORDER — SODIUM CHLORIDE 0.9 % (FLUSH) 0.9 %
10 SYRINGE (ML) INJECTION AS NEEDED
Status: DISCONTINUED | OUTPATIENT
Start: 2024-02-16 | End: 2024-02-17 | Stop reason: HOSPADM

## 2024-02-16 RX ORDER — NITROGLYCERIN 400 UG/1
1 SPRAY ORAL
Status: COMPLETED | OUTPATIENT
Start: 2024-02-16 | End: 2024-02-16

## 2024-02-16 RX ORDER — METOPROLOL TARTRATE 50 MG/1
50 TABLET, FILM COATED ORAL ONCE
Status: DISCONTINUED | OUTPATIENT
Start: 2024-02-16 | End: 2024-02-17 | Stop reason: HOSPADM

## 2024-02-16 RX ADMIN — NITROGLYCERIN 2 SPRAY: 400 SPRAY ORAL at 14:07

## 2024-02-16 RX ADMIN — IOPAMIDOL 65 ML: 755 INJECTION, SOLUTION INTRAVENOUS at 14:43

## 2024-02-19 ENCOUNTER — TELEPHONE (OUTPATIENT)
Dept: CARDIOLOGY | Facility: CLINIC | Age: 79
End: 2024-02-19
Payer: MEDICARE

## 2024-02-19 DIAGNOSIS — R06.09 DYSPNEA ON EXERTION: Primary | ICD-10-CM

## 2024-02-19 NOTE — TELEPHONE ENCOUNTER
----- Message from Juan Daniel Cha MD sent at 2/19/2024  2:14 PM EST -----  We need to get another type of CT.  Likely has granulomatous disease which is not uncommon in [place].  Would recommend CT non contrast of chest to evaluate further

## 2024-02-19 NOTE — TELEPHONE ENCOUNTER
Spoke with patient regarding results per DC. Patient states that she had histoplasmosis when she was in college and she has had a lesion in her left upper lobe since, but is agreeable to getting another CT to make sure the lesions haven't grown.

## 2024-02-25 LAB — CREAT BLDA-MCNC: 0.8 MG/DL (ref 0.6–1.3)

## 2024-02-28 ENCOUNTER — HOSPITAL ENCOUNTER (OUTPATIENT)
Dept: CARDIOLOGY | Facility: HOSPITAL | Age: 79
Discharge: HOME | End: 2024-02-28
Admitting: INTERNAL MEDICINE
Payer: MEDICARE

## 2024-02-28 VITALS
OXYGEN SATURATION: 100 % | DIASTOLIC BLOOD PRESSURE: 79 MMHG | WEIGHT: 100 LBS | SYSTOLIC BLOOD PRESSURE: 138 MMHG | HEIGHT: 65 IN | BODY MASS INDEX: 16.66 KG/M2 | HEART RATE: 69 BPM | RESPIRATION RATE: 16 BRPM

## 2024-02-28 DIAGNOSIS — R06.09 DYSPNEA ON EXERTION: ICD-10-CM

## 2024-02-28 DIAGNOSIS — R93.1 ABNORMAL FINDINGS ON DIAGNOSTIC IMAGING OF HEART AND CORONARY CIRCULATION: ICD-10-CM

## 2024-02-28 PROBLEM — Q20.8 ABNORMALITY OF LEFT ATRIAL APPENDAGE: Status: ACTIVE | Noted: 2024-02-28

## 2024-02-28 PROCEDURE — 93321 DOPPLER ECHO F-UP/LMTD STD: CPT

## 2024-02-28 PROCEDURE — 93325 DOPPLER ECHO COLOR FLOW MAPG: CPT

## 2024-02-28 PROCEDURE — 93312 ECHO TRANSESOPHAGEAL: CPT

## 2024-02-28 RX ADMIN — METHOHEXITAL SODIUM 10 MG: 500 INJECTION, POWDER, LYOPHILIZED, FOR SOLUTION INTRAMUSCULAR; INTRAVENOUS; RECTAL at 14:16

## 2024-02-28 NOTE — DISCHARGE SUMMARY
ARIANA procedure note    Discussed risk and benefits extensively.  Plan for limited exam given issues with sedation (hypopituitarism, has had issue with hypotension with surgery).  10mg of brevetol used for sedation.  Mildly sedated  Probe placed and removed without issue.  No immediate complications    [facility] findings  Left atrial appendage: no thrombus in multiple views.  Emptying velocity mildly reduced at 40 cm/sec      Hospital course  Uncomplicated ariana    Follow up  Dr. Cha        Your medication list        ASK your doctor about these medications        Instructions Last Dose Given Next Dose Due   atorvastatin 20 MG tablet  Commonly known as: LIPITOR      1 tablet Daily.       gabapentin 100 MG capsule  Commonly known as: NEURONTIN      Take 1 capsule by mouth Daily.       levothyroxine 50 MCG tablet  Commonly known as: SYNTHROID, LEVOTHROID      1 po daily 6 days per week       metoprolol succinate XL 25 MG 24 hr tablet  Commonly known as: TOPROL-XL      Please take 1 tablets for 3 days prior to scan and morning of       predniSONE 5 MG tablet  Commonly known as: DELTASONE      1 each am, 1/2 each pm       TYLENOL EXTRA STRENGTH PO      Take  by mouth As Needed.       vitamin D 1.25 MG (92525 UT) capsule capsule  Commonly known as: ERGOCALCIFEROL      Take 1 capsule by mouth 1 (One) Time Per Week.

## 2024-02-28 NOTE — H&P
Pre-procedure History and Physical  [facility]      Patient:  Anahi Tirado  :  1945  MRN: [id number]      PCP:  Tom Khoury MD  PHONE:  [phone]    DATE: 2024  ID: Anahi Tirado is a 78 y.o. female resident of [place], [place]     CC: CORADO, abnormal DAVID on Coronary CTA     PROBLEM LIST:   Active Hospital Problems    Diagnosis     **Abnormality of left atrial appendage     Dyspnea on exertion     Panhypopituitarism      Central hypot4. 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 Tirado is an 77 y/o female with above noted history who presents for SANJUANA today for further evaluation of DAVID abnormality noted on recent coronary CTA. She was seen in our office for right jaw pain and some occasional dyspnea. She has chronic back pain and is sedentary at home. Coronary CTA was pursued which showed total calcium 37, but filling defect vs DAVID thrombus. SANJUANA was recommended for further evaluation and she presents in this regard. CT also notable for EDWIGE nodular consolidations, over 2 cm. CT chest was ordered for follow up. Patient reports history of histoplasmosis.     Allergies:      Allergies   Allergen Reactions    Codeine Itching    Morphine Rash       MEDICATIONS:  Current Outpatient Medications   Medication Instructions    Acetaminophen (TYLENOL EXTRA STRENGTH PO) Oral, As Needed    atorvastatin (LIPITOR) 20 mg, Daily    gabapentin (NEURONTIN) 100 mg, Oral, Daily    levothyroxine (SYNTHROID, LEVOTHROID) 50 MCG tablet 1 po daily 6 days per week    metoprolol succinate XL (TOPROL-XL) 25 MG 24 hr tablet Please take 1 tablets for 3 days prior to scan and morning of    predniSONE (DELTASONE) 5 MG tablet 1 each am, 1/2 each pm    vitamin D (ERGOCALCIFEROL)  "50,000 Units, Oral, Weekly       Past medical & surgical history, social and family history reviewed in the electronic medical record.    ROS: Pertinent positives listed in the HPI and problem list above. All others reviewed and negative.     Physical Exam:   /69   Pulse 65   Ht 165.1 cm (65\")   Wt 45.4 kg (100 lb)   SpO2 98%   BMI 16.64 kg/m²     Constitutional:    Alert, cooperative, in no acute distress   Neck:     No Jugular venous distention, adenopathy, or thyromegaly noted.    Heart:    Regular rhythm and normal rate, normal S1 and S2, no murmurs,gallops, rubs, or clicks. No distinct PMI noted.    Lungs:     Clear to auscultation bilaterally, respirations regular, even and unlabored    Extremities:   No gross deformities, no edema, clubbing, or cyanosis.      Labs and Diagnostic Data:    Coronary CTA 2/16/24:  IMPRESSION:  1.  No coronary anomalies  2.  Left dominant circulation  3.  Mild mixed plaque in proximal LAD. CAC 37  4.  Filing defect vs. Left atrial appendage thrombus         Tele: SR    IMPRESSION:  79 y/o female with hypopituitarism, chronic back pain and jaw pain with recent abnormal coronary CTA as noted above, concerning for filling defect vs thrombus in DAVID.     PLAN:  Procedure to perform: SANJUANA with Dr. Cha. Risks, benefits and alternatives to the procedure explained to the patient and she understands and wishes to proceed.       Electronically signed by Jayashree Vidales PA-C, 02/28/24, 2:14 PM EST.        "

## 2024-03-11 ENCOUNTER — TELEPHONE (OUTPATIENT)
Dept: CARDIOLOGY | Facility: CLINIC | Age: 79
End: 2024-03-11
Payer: MEDICARE

## 2024-03-11 NOTE — TELEPHONE ENCOUNTER
Spoke with patient regarding recommendations per DC. Patient agreeable to plan and all questions answered at this time.

## 2024-03-11 NOTE — TELEPHONE ENCOUNTER
----- Message from Juan Daniel Cha MD sent at 3/11/2024  9:49 AM EDT -----  Repeat demonstration of lung mass.  At this point would recommend pulmonary follow up to eval further if necessary.  Also thoracici compression fracture noted.  Would follow up pcp regarding that

## 2024-05-21 ENCOUNTER — OFFICE VISIT (OUTPATIENT)
Dept: PULMONOLOGY | Facility: CLINIC | Age: 79
End: 2024-05-21
Payer: MEDICARE

## 2024-05-21 VITALS
BODY MASS INDEX: 16.66 KG/M2 | HEIGHT: 65 IN | OXYGEN SATURATION: 96 % | DIASTOLIC BLOOD PRESSURE: 74 MMHG | HEART RATE: 78 BPM | SYSTOLIC BLOOD PRESSURE: 126 MMHG | WEIGHT: 100 LBS | TEMPERATURE: 97.6 F

## 2024-05-21 DIAGNOSIS — J98.4 APICAL LUNG SCARRING: ICD-10-CM

## 2024-05-21 DIAGNOSIS — R63.4 WEIGHT LOSS, UNINTENTIONAL: ICD-10-CM

## 2024-05-21 DIAGNOSIS — R93.89 ABNORMAL CHEST CT: Primary | ICD-10-CM

## 2024-05-21 PROCEDURE — 1160F RVW MEDS BY RX/DR IN RCRD: CPT | Performed by: INTERNAL MEDICINE

## 2024-05-21 PROCEDURE — 1159F MED LIST DOCD IN RCRD: CPT | Performed by: INTERNAL MEDICINE

## 2024-05-21 PROCEDURE — 94729 DIFFUSING CAPACITY: CPT | Performed by: INTERNAL MEDICINE

## 2024-05-21 PROCEDURE — 99204 OFFICE O/P NEW MOD 45 MIN: CPT | Performed by: INTERNAL MEDICINE

## 2024-05-21 PROCEDURE — 94010 BREATHING CAPACITY TEST: CPT | Performed by: INTERNAL MEDICINE

## 2024-05-21 PROCEDURE — 94726 PLETHYSMOGRAPHY LUNG VOLUMES: CPT | Performed by: INTERNAL MEDICINE

## 2024-05-21 NOTE — PROGRESS NOTES
"  PULMONARY  NOTE    Chief Complaint     Abnormal chest CT, non-smoker, unexplained weight loss    History of Present Illness     79-year-old female referred for an abnormal CT scan of the chest    She is non-smoker with no history of asthma or COPD  She does indicate that she has had a chronically abnormal chest x-ray and has been told in the past that she had \"histoplasmosis\"    She had unexplained weight loss about 3 years ago lost 50 pounds for unclear reasons  She has been underweight since that time    She has waxing and waning abdominal pain and bloating    She also has back pain, as well    No regular cough or sputum production  She denies dyspnea    She underwent chest imaging with findings as noted below  Apparently due to her abnormal baseline CT scan she had a PET scan and CT scan in 2017 in [place]  Unfortunately I do not have those scans for direct comparison, currently    Patient Active Problem List   Diagnosis    Panhypopituitarism    Iron deficiency anemia    Dyspnea on exertion    Abnormal finding on thyroid function test    Back pain    Hyperlipidemia    Hypothyroidism    Abnormality of left atrial appendage    Apical lung scarring    Abnormal chest CT    Weight loss, unintentional      Allergies   Allergen Reactions    Codeine Itching    Morphine Rash       Current Outpatient Medications:     Acetaminophen (TYLENOL EXTRA STRENGTH PO), Take  by mouth As Needed., Disp: , Rfl:     atorvastatin (LIPITOR) 20 MG tablet, 1 tablet Daily., Disp: , Rfl:     gabapentin (NEURONTIN) 100 MG capsule, Take 1 capsule by mouth Daily., Disp: , Rfl:     levothyroxine (SYNTHROID, LEVOTHROID) 50 MCG tablet, 1 po daily 6 days per week, Disp: 72 tablet, Rfl: 1    metoprolol succinate XL (TOPROL-XL) 25 MG 24 hr tablet, Please take 1 tablets for 3 days prior to scan and morning of, Disp: 10 tablet, Rfl: 0    predniSONE (DELTASONE) 5 MG tablet, 1 each am, 1/2 each pm (Patient taking differently: 1 tablet Daily.), Disp: " "135 tablet, Rfl: 3    vitamin D (ERGOCALCIFEROL) 1.25 MG (64261 UT) capsule capsule, Take 1 capsule by mouth 1 (One) Time Per Week., Disp: , Rfl:   MEDICATION LIST AND ALLERGIES REVIEWED.    Family History   Problem Relation Age of Onset    Heart disease Mother     Heart disease Father      Social History     Tobacco Use    Smoking status: Never    Smokeless tobacco: Never   Vaping Use    Vaping status: Never Used   Substance Use Topics    Alcohol use: Never    Drug use: Never     Social History     Social History Narrative    Non-smoker     FAMILY AND SOCIAL HISTORY REVIEWED.    Review of Systems  IF PRESENT REFER TO SCANNED ROS SHEET FROM SAME DATE  OTHERWISE ROS OBTAINED AND NON-CONTRIBUTORY OVER HPI.    /74   Pulse 78   Temp 97.6 °F (36.4 °C)   Ht 165.1 cm (65\")   Wt 45.4 kg (100 lb)   SpO2 96% Comment: resting, room air  BMI 16.64 kg/m²   Physical Exam  Vitals and nursing note reviewed.   Constitutional:       General: She is not in acute distress.     Appearance: She is well-developed. She is not diaphoretic.   HENT:      Head: Normocephalic and atraumatic.   Neck:      Thyroid: No thyromegaly.   Cardiovascular:      Rate and Rhythm: Normal rate and regular rhythm.      Heart sounds: Normal heart sounds. No murmur heard.  Pulmonary:      Effort: Pulmonary effort is normal.      Breath sounds: Normal breath sounds. No stridor.   Lymphadenopathy:      Cervical: No cervical adenopathy.      Upper Body:      Right upper body: No supraclavicular or epitrochlear adenopathy.      Left upper body: No supraclavicular or epitrochlear adenopathy.   Skin:     General: Skin is warm and dry.   Neurological:      Mental Status: She is alert and oriented to person, place, and time.   Psychiatric:         Behavior: Behavior normal.         Results     CT scan of the chest reviewed  Biapical opacities, left greater than right    PFTs are difficult to interpret but no overt airway obstruction, no restriction and a " reduced diffusion capacity    Immunization History   Administered Date(s) Administered    COVID-19 (MODERNA) 1st,2nd,3rd Dose Monovalent 02/25/2021, 03/25/2021    COVID-19 (MODERNA) Monovalent Original Booster 11/05/2021    FLUAD TRI 65YR+ 10/22/2020    Fluad Quad 65+ 09/20/2021, 11/21/2023    Pneumococcal, Unspecified 08/15/2023    Shingrix 10/22/2020     Problem List       ICD-10-CM ICD-9-CM   1. Abnormal chest CT  R93.89 793.2   2. Apical lung scarring  J98.4 518.89   3. Weight loss, unintentional  R63.4 783.21       Discussion     We reviewed her chest imaging  The opacities in her apices look to be post infectious scarring however an active process cannot be excluded  It appears she had a PET scan back in 2017 to which the scan was compared  I do not have that scan for comparison but we will get that PET scan and compared to her current scan and if stable then would probably recommend no further chest imaging    She has had unexplained weight loss, abdominal pain, back pain, and has paraesophageal hernia  She is asking about gastroenterology referral which I will facilitate    At the very least I will see her back in 6 months or earlier if there is some major difference between her 2017 PET scan and her most recent CT scan of the chest    Moderate level of Medical Decision Making complexity based on 1 undiagnosed new problem or 2 stable chronic conditions, independent interpretation of tests, and/or prescription drug management     Nikolay Aguayo MD  Note electronically signed    CC: Tom Khoury MD

## 2024-05-29 ENCOUNTER — DOCUMENTATION (OUTPATIENT)
Dept: PULMONOLOGY | Facility: CLINIC | Age: 79
End: 2024-05-29
Payer: MEDICARE

## 2024-05-29 NOTE — PROGRESS NOTES
"We obtained the patient's CT scan of the chest and PET scan from 6/21/20 17 for comparison    Interestingly at that time she had a well-circumscribed rounded masslike density in the left apex that was about 18 mm wide.  There was some medial fibrotic appearing changes and then a little bit farther down in the left upper lobe there was almost a horseshoe shaped solid density, as well    In comparison to her scan from March 8, 2024 that rounded masslike density appears to be more of a triangular fibrotic process and the \"horseshoe\" shaped density farther down in the left upper lobe appears exactly the same  No other radiographic changes.    At this point the scan appears much less ominous now than it did in 2017.  At the very least would get a 1 year follow-up CT scan of the chest  "

## 2024-06-17 ENCOUNTER — OFFICE VISIT (OUTPATIENT)
Dept: GASTROENTEROLOGY | Facility: CLINIC | Age: 79
End: 2024-06-17
Payer: MEDICARE

## 2024-06-17 VITALS
WEIGHT: 100.2 LBS | SYSTOLIC BLOOD PRESSURE: 130 MMHG | BODY MASS INDEX: 16.69 KG/M2 | TEMPERATURE: 97.1 F | DIASTOLIC BLOOD PRESSURE: 80 MMHG | OXYGEN SATURATION: 95 % | HEART RATE: 68 BPM | HEIGHT: 65 IN

## 2024-06-17 DIAGNOSIS — R63.4 WEIGHT LOSS: Primary | ICD-10-CM

## 2024-06-17 DIAGNOSIS — K44.9 HIATAL HERNIA: ICD-10-CM

## 2024-06-17 DIAGNOSIS — R10.11 RUQ ABDOMINAL PAIN: ICD-10-CM

## 2024-06-17 PROBLEM — E44.0 MODERATE PROTEIN-CALORIE MALNUTRITION: Status: ACTIVE | Noted: 2024-06-17

## 2024-06-17 PROBLEM — K21.9 GERD (GASTROESOPHAGEAL REFLUX DISEASE): Status: ACTIVE | Noted: 2024-06-17

## 2024-06-17 PROBLEM — N39.0 UTI (URINARY TRACT INFECTION): Status: ACTIVE | Noted: 2024-06-17

## 2024-06-17 PROBLEM — E27.2 ADRENAL CRISIS: Status: ACTIVE | Noted: 2024-06-17

## 2024-06-17 PROBLEM — Z79.899 ON DEEP VEIN THROMBOSIS (DVT) PROPHYLAXIS: Status: ACTIVE | Noted: 2024-06-17

## 2024-06-17 PROBLEM — R53.81 PHYSICAL DEBILITY: Status: ACTIVE | Noted: 2024-06-17

## 2024-06-17 PROCEDURE — 99203 OFFICE O/P NEW LOW 30 MIN: CPT | Performed by: NURSE PRACTITIONER

## 2024-06-17 PROCEDURE — 1160F RVW MEDS BY RX/DR IN RCRD: CPT | Performed by: NURSE PRACTITIONER

## 2024-06-17 PROCEDURE — 1159F MED LIST DOCD IN RCRD: CPT | Performed by: NURSE PRACTITIONER

## 2024-06-17 NOTE — PROGRESS NOTES
"     New Patient Consultation     Patient Name: Anahi Tirado  : 1945   MRN: [id number]     Chief Complaint:    Chief Complaint   Patient presents with    Consult     Abnormal CT scan       History of Present Illness: Anahi Tirado is a 79 y.o. female who is here today with a medical hx of HLD,panhypopituitarism, hypothyroidism, ESTEPHANIE for a Gastroenterology Consultation for unintentional weight loss     Anahi reports losing 50 lbs 4 years ago and has not gained it back.  She had a colonoscopy (and maybe an EGD) in [place] during this time (unsure of results).  She denies nausea or vomiting.  There is some right upper abdominal pain depending on how she sits or with certain movements. She has a hx of chronic back pain.  Her appetite is limited due to problems with her dentures and her facial paralysis- she is following with a dentist- has a FU this month with  dentistry.    She denies GERD or dysphagia. There is no blood in the stool. She has normal bms.    She was told by her back doctor to never be put to sleep again after an \"adrenal crisis\" following anesthesia.      Hx of pituitary tumor with resection and radiation.     Abdominal surgical history of appendectomy  Subjective      Review of Systems:   Review of Systems   Constitutional:  Negative for appetite change and unexpected weight loss.   HENT:  Positive for dental problem. Negative for trouble swallowing.    Gastrointestinal:  Positive for abdominal pain. Negative for abdominal distention, anal bleeding, blood in stool, constipation, diarrhea, nausea, rectal pain, vomiting, GERD and indigestion.       Past Medical History:   Past Medical History:   Diagnosis Date    Arthritis     Hyperlipidemia     Hypothyroidism        Past Surgical History:   Past Surgical History:   Procedure Laterality Date    APPENDECTOMY      BREAST LUMPECTOMY Left     PITUITARY SURGERY         Family History:   Family History   Problem Relation Age of Onset    " "Heart disease Mother     Heart disease Father        Social History:   Social History     Socioeconomic History    Marital status: Unknown   Tobacco Use    Smoking status: Never    Smokeless tobacco: Never   Vaping Use    Vaping status: Never Used   Substance and Sexual Activity    Alcohol use: Never    Drug use: Never    Sexual activity: Defer       Alcohol/Tobacco History:   Social History     Substance and Sexual Activity   Alcohol Use Never     Social History     Tobacco Use   Smoking Status Never   Smokeless Tobacco Never       Medications:     Current Outpatient Medications:     Acetaminophen (TYLENOL EXTRA STRENGTH PO), Take  by mouth As Needed., Disp: , Rfl:     atorvastatin (LIPITOR) 20 MG tablet, 1 tablet Daily., Disp: , Rfl:     gabapentin (NEURONTIN) 100 MG capsule, Take 1 capsule by mouth Daily., Disp: , Rfl:     levothyroxine (SYNTHROID, LEVOTHROID) 50 MCG tablet, 1 po daily 6 days per week, Disp: 72 tablet, Rfl: 1    metoprolol succinate XL (TOPROL-XL) 25 MG 24 hr tablet, Please take 1 tablets for 3 days prior to scan and morning of, Disp: 10 tablet, Rfl: 0    predniSONE (DELTASONE) 5 MG tablet, 1 each am, 1/2 each pm (Patient taking differently: 1 tablet Daily.), Disp: 135 tablet, Rfl: 3    vitamin D (ERGOCALCIFEROL) 1.25 MG (66831 UT) capsule capsule, Take 1 capsule by mouth 1 (One) Time Per Week., Disp: , Rfl:     Allergies:   Allergies   Allergen Reactions    Codeine Itching    Morphine Rash       Objective     Physical Exam:  Vital Signs:   Vitals:    06/17/24 1406   BP: 130/80   BP Location: Left arm   Patient Position: Sitting   Cuff Size: Adult   Pulse: 68   Temp: 97.1 °F (36.2 °C)   TempSrc: Temporal   SpO2: 95%   Weight: 45.5 kg (100 lb 3.2 oz)   Height: 165.1 cm (65\")     Body mass index is 16.67 kg/m².     Physical Exam  Constitutional:       General: She is not in acute distress.     Appearance: She is well-developed.   Pulmonary:      Effort: Pulmonary effort is normal. No accessory " muscle usage or respiratory distress.   Abdominal:      General: Bowel sounds are normal. There is no distension.      Palpations: Abdomen is soft.      Tenderness: There is abdominal tenderness in the right upper quadrant. There is no guarding. Negative signs include Simon's sign.   Skin:     Coloration: Skin is not pale.      Findings: No erythema.   Neurological:      Mental Status: She is alert and oriented to person, place, and time.   Psychiatric:         Speech: Speech normal.         Behavior: Behavior normal.         Thought Content: Thought content normal.         Judgment: Judgment normal.         Assessment / Plan      Assessment/Plan:   Diagnoses and all orders for this visit:    1. Weight loss (Primary)  -     CT Abdomen Pelvis With & Without Contrast; Future    2. Hiatal hernia  -     CT Abdomen Pelvis With & Without Contrast; Future  -     FL upper gi single contrast w kub; Future    3. RUQ abdominal pain    Request colonoscopy and ?  EGD from [facility]  Right upper quadrant pain seems to be of musculoskeletal origin  Obtain upper GI and CT abdomen pelvis    Follow Up: TBD  No follow-ups on file.    Plan of care reviewed with the patient at the conclusion of today's visit.  Education was provided regarding diagnosis, management, and any prescribed or recommended OTC medications.  Patient verbalized understanding of and agreement with management plan.     MIGUEL Seymour  [facility]

## 2024-08-15 ENCOUNTER — HOSPITAL ENCOUNTER (EMERGENCY)
Facility: HOSPITAL | Age: 79
Discharge: HOME | End: 2024-08-15
Attending: EMERGENCY MEDICINE
Payer: MEDICARE

## 2024-08-15 ENCOUNTER — APPOINTMENT (OUTPATIENT)
Dept: GENERAL RADIOLOGY | Facility: HOSPITAL | Age: 79
End: 2024-08-15
Payer: MEDICARE

## 2024-08-15 ENCOUNTER — HOSPITAL ENCOUNTER (OUTPATIENT)
Dept: CT IMAGING | Facility: HOSPITAL | Age: 79
Discharge: HOME | End: 2024-08-15
Payer: MEDICARE

## 2024-08-15 ENCOUNTER — HOSPITAL ENCOUNTER (OUTPATIENT)
Dept: GENERAL RADIOLOGY | Facility: HOSPITAL | Age: 79
Discharge: HOME | End: 2024-08-15
Payer: MEDICARE

## 2024-08-15 VITALS
WEIGHT: 100.31 LBS | SYSTOLIC BLOOD PRESSURE: 103 MMHG | DIASTOLIC BLOOD PRESSURE: 52 MMHG | TEMPERATURE: 97.7 F | HEART RATE: 59 BPM | RESPIRATION RATE: 18 BRPM | HEIGHT: 65 IN | BODY MASS INDEX: 16.71 KG/M2 | OXYGEN SATURATION: 95 %

## 2024-08-15 DIAGNOSIS — K44.9 HIATAL HERNIA: ICD-10-CM

## 2024-08-15 DIAGNOSIS — R63.4 WEIGHT LOSS: ICD-10-CM

## 2024-08-15 DIAGNOSIS — R07.9 CHEST PAIN, UNSPECIFIED TYPE: Primary | ICD-10-CM

## 2024-08-15 LAB
ALBUMIN SERPL-MCNC: 3.8 G/DL (ref 3.5–5.2)
ALBUMIN/GLOB SERPL: 2.1 G/DL
ALP SERPL-CCNC: 45 U/L (ref 39–117)
ALT SERPL W P-5'-P-CCNC: 29 U/L (ref 1–33)
ANION GAP SERPL CALCULATED.3IONS-SCNC: 14 MMOL/L (ref 5–15)
AST SERPL-CCNC: 58 U/L (ref 1–32)
BASOPHILS # BLD AUTO: 0.07 10*3/MM3 (ref 0–0.2)
BASOPHILS NFR BLD AUTO: 0.9 % (ref 0–1.5)
BILIRUB SERPL-MCNC: 0.6 MG/DL (ref 0–1.2)
BUN SERPL-MCNC: 28 MG/DL (ref 8–23)
BUN/CREAT SERPL: 26.9 (ref 7–25)
CALCIUM SPEC-SCNC: 8.7 MG/DL (ref 8.6–10.5)
CHLORIDE SERPL-SCNC: 106 MMOL/L (ref 98–107)
CO2 SERPL-SCNC: 22 MMOL/L (ref 22–29)
CREAT SERPL-MCNC: 1.04 MG/DL (ref 0.57–1)
DEPRECATED RDW RBC AUTO: 53.8 FL (ref 37–54)
EGFRCR SERPLBLD CKD-EPI 2021: 54.1 ML/MIN/1.73
EOSINOPHIL # BLD AUTO: 0.01 10*3/MM3 (ref 0–0.4)
EOSINOPHIL NFR BLD AUTO: 0.1 % (ref 0.3–6.2)
ERYTHROCYTE [DISTWIDTH] IN BLOOD BY AUTOMATED COUNT: 15.5 % (ref 12.3–15.4)
GEN 5 2HR TROPONIN T REFLEX: 24 NG/L
GLOBULIN UR ELPH-MCNC: 1.8 GM/DL
GLUCOSE SERPL-MCNC: 85 MG/DL (ref 65–99)
HCT VFR BLD AUTO: 37.9 % (ref 34–46.6)
HGB BLD-MCNC: 12 G/DL (ref 12–15.9)
HOLD SPECIMEN: NORMAL
IMM GRANULOCYTES # BLD AUTO: 0.04 10*3/MM3 (ref 0–0.05)
IMM GRANULOCYTES NFR BLD AUTO: 0.5 % (ref 0–0.5)
LIPASE SERPL-CCNC: 34 U/L (ref 13–60)
LYMPHOCYTES # BLD AUTO: 2 10*3/MM3 (ref 0.7–3.1)
LYMPHOCYTES NFR BLD AUTO: 25.3 % (ref 19.6–45.3)
MCH RBC QN AUTO: 29.9 PG (ref 26.6–33)
MCHC RBC AUTO-ENTMCNC: 31.7 G/DL (ref 31.5–35.7)
MCV RBC AUTO: 94.5 FL (ref 79–97)
MONOCYTES # BLD AUTO: 0.51 10*3/MM3 (ref 0.1–0.9)
MONOCYTES NFR BLD AUTO: 6.5 % (ref 5–12)
NEUTROPHILS NFR BLD AUTO: 5.27 10*3/MM3 (ref 1.7–7)
NEUTROPHILS NFR BLD AUTO: 66.7 % (ref 42.7–76)
NRBC BLD AUTO-RTO: 0 /100 WBC (ref 0–0.2)
NT-PROBNP SERPL-MCNC: 3976 PG/ML (ref 0–1800)
PLATELET # BLD AUTO: 232 10*3/MM3 (ref 140–450)
PMV BLD AUTO: 10.1 FL (ref 6–12)
POTASSIUM SERPL-SCNC: 4.1 MMOL/L (ref 3.5–5.2)
PROT SERPL-MCNC: 5.6 G/DL (ref 6–8.5)
QT INTERVAL: 392 MS
QT INTERVAL: 428 MS
QTC INTERVAL: 425 MS
QTC INTERVAL: 434 MS
RBC # BLD AUTO: 4.01 10*6/MM3 (ref 3.77–5.28)
SODIUM SERPL-SCNC: 142 MMOL/L (ref 136–145)
TROPONIN T DELTA: 4 NG/L
TROPONIN T SERPL HS-MCNC: 20 NG/L
WBC NRBC COR # BLD AUTO: 7.9 10*3/MM3 (ref 3.4–10.8)
WHOLE BLOOD HOLD COAG: NORMAL
WHOLE BLOOD HOLD SPECIMEN: NORMAL

## 2024-08-15 PROCEDURE — 84484 ASSAY OF TROPONIN QUANT: CPT | Performed by: EMERGENCY MEDICINE

## 2024-08-15 PROCEDURE — 83880 ASSAY OF NATRIURETIC PEPTIDE: CPT | Performed by: EMERGENCY MEDICINE

## 2024-08-15 PROCEDURE — 85025 COMPLETE CBC W/AUTO DIFF WBC: CPT | Performed by: EMERGENCY MEDICINE

## 2024-08-15 PROCEDURE — 0 DIATRIZOATE MEGLUMINE & SODIUM PER 1 ML: Performed by: NURSE PRACTITIONER

## 2024-08-15 PROCEDURE — 99282 EMERGENCY DEPT VISIT SF MDM: CPT

## 2024-08-15 PROCEDURE — 80053 COMPREHEN METABOLIC PANEL: CPT | Performed by: EMERGENCY MEDICINE

## 2024-08-15 PROCEDURE — 74240 X-RAY XM UPR GI TRC 1CNTRST: CPT

## 2024-08-15 PROCEDURE — 99284 EMERGENCY DEPT VISIT MOD MDM: CPT

## 2024-08-15 PROCEDURE — 83690 ASSAY OF LIPASE: CPT | Performed by: EMERGENCY MEDICINE

## 2024-08-15 PROCEDURE — 71045 X-RAY EXAM CHEST 1 VIEW: CPT

## 2024-08-15 PROCEDURE — 36415 COLL VENOUS BLD VENIPUNCTURE: CPT

## 2024-08-15 PROCEDURE — 93005 ELECTROCARDIOGRAM TRACING: CPT | Performed by: EMERGENCY MEDICINE

## 2024-08-15 RX ORDER — ASPIRIN 81 MG/1
324 TABLET, CHEWABLE ORAL ONCE
Status: COMPLETED | OUTPATIENT
Start: 2024-08-15 | End: 2024-08-15

## 2024-08-15 RX ORDER — SODIUM CHLORIDE 0.9 % (FLUSH) 0.9 %
10 SYRINGE (ML) INJECTION AS NEEDED
Status: DISCONTINUED | OUTPATIENT
Start: 2024-08-15 | End: 2024-08-15 | Stop reason: HOSPADM

## 2024-08-15 RX ORDER — PANTOPRAZOLE SODIUM 40 MG/1
40 TABLET, DELAYED RELEASE ORAL ONCE
Status: COMPLETED | OUTPATIENT
Start: 2024-08-15 | End: 2024-08-15

## 2024-08-15 RX ADMIN — ASPIRIN 81 MG 324 MG: 81 TABLET ORAL at 12:26

## 2024-08-15 RX ADMIN — PANTOPRAZOLE SODIUM 40 MG: 40 TABLET, DELAYED RELEASE ORAL at 13:15

## 2024-08-15 NOTE — CONSULTS
[facility] Cardiology  ER Consultation     Patient: Anahi Tirado  1945    [address]  [place] [address]    PCP:  Tom Khoury MD   Treatment Team:   Attending Provider: Marcio Avalos MD  Consulting Physician: Juan Daniel Cha MD   8/15/2024    Primary cardiologist: Jason Cha MD    DATE OF CONSULTATION: 8/15/2024 14:43 EDT     IDENTIFICATION: A 79 y.o. 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  Appendectomy   Left breast lumpectomy     Allergies  Allergies   Allergen Reactions    Codeine Itching    Morphine Rash       Home Medications:  Current Outpatient Medications   Medication Instructions    Acetaminophen (TYLENOL EXTRA STRENGTH PO) Oral, As Needed    atorvastatin (LIPITOR) 20 mg, Daily    gabapentin (NEURONTIN) 100 mg, Oral, Daily    levothyroxine (SYNTHROID, LEVOTHROID) 50 MCG tablet 1 po daily 6 days per week    predniSONE (DELTASONE) 5 MG tablet 1 each am, 1/2 each pm    vitamin D (ERGOCALCIFEROL) 50,000 Units, Oral, Weekly        Current Medications    Current Facility-Administered Medications:     sodium chloride 0.9 % flush 10 mL, 10 mL, Intravenous, PRN, Marcio Avalos MD    Current Outpatient Medications:     Acetaminophen (TYLENOL EXTRA STRENGTH PO), Take  by mouth As Needed., Disp: , Rfl:     atorvastatin (LIPITOR) 20 MG tablet, 1 tablet Daily., Disp: , Rfl:     gabapentin (NEURONTIN) 100 MG capsule, Take 1 capsule by mouth Daily., Disp: , Rfl:     levothyroxine (SYNTHROID, LEVOTHROID) 50 MCG tablet, 1 po daily 6 days per week, Disp: 72 tablet, Rfl: 1    predniSONE (DELTASONE) 5 MG tablet, 1 each am, 1/2 each pm  (Patient taking differently: 1 tablet Daily.), Disp: 135 tablet, Rfl: 3    vitamin D (ERGOCALCIFEROL) 1.25 MG (40009 UT) capsule capsule, Take 1 capsule by mouth 1 (One) Time Per Week., Disp: , Rfl:        History of Present Illness   Anahi Tirado is a 79 y.o. year old female with a past medical history significant for hypothyroidism, panhypopituitarism s/p removal, HLD, chronic dyspnea with abnormal CT chest (follows with pulmonary), and ESTEPHANIE (under evaluation by GI) who presented to the ER today after CT scan with oral contrast was attempted at [facility]. She developed chest pressure and the sensation that she needed to belch after ingesting oral contrast. She did not have associated shortness of breath, diaphoresis, or vomiting. Symptoms resolved with Zofran. She was brought to ER for further evaluation. CT abd/pelvis was ordered by GI to work her up for anorexia, ESTEPHANIE, hiatal hernia, and unexplained weight loss. She states for the last month she has only been able to drink boost supplements. She denies dysphagia, GERD, and odynophagia.  She admits to being very fatigued and weak.    She denies chest pain, dyspnea at rest, orthopnea, PND, tachypalpitations, edema and presyncope/syncope.    Cardiology has been consulted for chest pain (now resolved), mildly elevated troponin, elevated proBNP, and new T wave inversion on EKG.   She follows with Dr. Cha in our office and completed coronary CTA in February which did not show obstructive CAD or significant plaque burden. SANJUANA in February 2024 showed normal LV function and no DAVID thrombus.  She takes atorvastatin but no other cardiac meds.    ROS  Review of Systems   Constitutional: Positive for decreased appetite, malaise/fatigue and weight loss.   Cardiovascular:  Positive for chest pain and dyspnea on exertion.   Gastrointestinal:  Positive for abdominal pain, anorexia and nausea.   Neurological:  Positive for light-headedness and  "weakness.   All other systems reviewed and are negative.      SOCIAL HX  Social History     Socioeconomic History    Marital status: Single   Tobacco Use    Smoking status: Never    Smokeless tobacco: Never   Vaping Use    Vaping status: Never Used   Substance and Sexual Activity    Alcohol use: Never    Drug use: Never    Sexual activity: Defer       FAMILY HX  Family History   Problem Relation Age of Onset    Heart disease Mother     Heart disease Father        OBJECTIVE:  Vitals:    08/15/24 1200 08/15/24 1300 08/15/24 1330 08/15/24 1400   BP: 105/85 100/58 103/55 95/41   BP Location: Right arm      Patient Position: Lying      Pulse: 77 64 62 53   Resp: 18      Temp: 97.7 °F (36.5 °C)      TempSrc: Oral      SpO2: 96% 95% 96% 92%   Weight: 45.5 kg (100 lb 5 oz)      Height: 165.1 cm (65\")        No intake/output data recorded.  No intake/output data recorded.  Intake & Output (last 3 days)       None             PHYSICAL EXAMINATION:  Vitals reviewed.   Constitutional:       Appearance: Cachectic and frail.   Pulmonary:      Effort: Pulmonary effort is normal.      Breath sounds: Normal breath sounds.   Cardiovascular:      Normal rate. Regular rhythm. Normal S1. Normal S2.       Murmurs: There is no murmur.   Pulses:     Intact distal pulses.   Edema:     Peripheral edema absent.   Skin:     General: Skin is warm and dry.   Neurological:      General: No focal deficit present.      Mental Status: Alert.   Psychiatric:         Behavior: Behavior is cooperative.         Diagnostic Data:  Lab Results (last 24 hours)       Procedure Component Value Units Date/Time    High Sensitivity Troponin T 2Hr [[id number]] Collected: 08/15/24 1434    Specimen: Blood from Arm, Left Updated: 08/15/24 1437    Comprehensive Metabolic Panel [[id number]]  (Abnormal) Collected: 08/15/24 1219    Specimen: Blood Updated: 08/15/24 1254     Glucose 85 mg/dL      BUN 28 mg/dL      Creatinine 1.04 mg/dL      Sodium 142 mmol/L      Potassium " 4.1 mmol/L      Comment: Slight hemolysis detected by analyzer. Result may be falsely elevated.        Chloride 106 mmol/L      CO2 22.0 mmol/L      Calcium 8.7 mg/dL      Total Protein 5.6 g/dL      Albumin 3.8 g/dL      ALT (SGPT) 29 U/L      AST (SGOT) 58 U/L      Alkaline Phosphatase 45 U/L      Total Bilirubin 0.6 mg/dL      Globulin 1.8 gm/dL      Comment: Calculated Result        A/G Ratio 2.1 g/dL      BUN/Creatinine Ratio 26.9     Anion Gap 14.0 mmol/L      eGFR 54.1 mL/min/1.73     Narrative:      GFR Normal >60  Chronic Kidney Disease <60  Kidney Failure <15    The GFR formula is only valid for adults with stable renal function between ages 18 and 70.    Lipase [[id number]]  (Normal) Collected: 08/15/24 1219    Specimen: Blood Updated: 08/15/24 1254     Lipase 34 U/L     BNP [[id number]]  (Abnormal) Collected: 08/15/24 1219    Specimen: Blood Updated: 08/15/24 1254     proBNP 3,976.0 pg/mL     Narrative:      This assay is used as an aid in the diagnosis of individuals suspected of having heart failure. It can be used as an aid in the diagnosis of acute decompensated heart failure (ADHF) in patients presenting with signs and symptoms of ADHF to the emergency department (ED). In addition, NT-proBNP of <300 pg/mL indicates ADHF is not likely.    Age Range Result Interpretation  NT-proBNP Concentration (pg/mL:      <50             Positive            >450                   Gray                 300-450                    Negative             <300    50-75           Positive            >900                  Gray                300-900                  Negative            <300      >75             Positive            >1800                  Gray                300-1800                  Negative            <300    High Sensitivity Troponin T [[id number]]  (Abnormal) Collected: 08/15/24 1219    Specimen: Blood Updated: 08/15/24 1251     HS Troponin T 20 ng/L     Narrative:      High Sensitive Troponin T Reference  Range:  <14.0 ng/L- Negative Female for AMI  <22.0 ng/L- Negative Male for AMI  >=14 - Abnormal Female indicating possible myocardial injury.  >=22 - Abnormal Male indicating possible myocardial injury.   Clinicians would have to utilize clinical acumen, EKG, Troponin, and serial changes to determine if it is an Acute Myocardial Infarction or myocardial injury due to an underlying chronic condition.         [place] Draw [[id number]] Collected: 08/15/24 1219    Specimen: Blood Updated: 08/15/24 1230    Narrative:      The following orders were created for panel order [place] Draw.  Procedure                               Abnormality         Status                     ---------                               -----------         ------                     Green Top (Gel)[[id number]]                                  Final result               Lavender Top[[id number]]                                     Final result               Gold Top - SST[[id number]]                                   Final result               Blake Top[[id number]]                                         Final result               Light Blue Top[[id number]]                                   Final result                 Please view results for these tests on the individual orders.    Green Top (Gel) [[id number]] Collected: 08/15/24 1219    Specimen: Blood Updated: 08/15/24 1230     Extra Tube Hold for add-ons.     Comment: Auto resulted.       Lavender Top [[id number]] Collected: 08/15/24 1219    Specimen: Blood Updated: 08/15/24 1230     Extra Tube hold for add-on     Comment: Auto resulted       Gold Top - SST [[id number]] Collected: 08/15/24 1219    Specimen: Blood Updated: 08/15/24 1230     Extra Tube Hold for add-ons.     Comment: Auto resulted.       Gray Top [[id number]] Collected: 08/15/24 1219    Specimen: Blood Updated: 08/15/24 1230     Extra Tube Hold for add-ons.     Comment: Auto resulted.       Light Blue Top [[id number]] Collected: 08/15/24 1219     Specimen: Blood Updated: 08/15/24 1230     Extra Tube Hold for add-ons.     Comment: Auto resulted       CBC & Differential [[id number]]  (Abnormal) Collected: 08/15/24 1219    Specimen: Blood Updated: 08/15/24 1226    Narrative:      The following orders were created for panel order CBC & Differential.  Procedure                               Abnormality         Status                     ---------                               -----------         ------                     CBC Auto Differential[[id number]]        Abnormal            Final result                 Please view results for these tests on the individual orders.    CBC Auto Differential [[id number]]  (Abnormal) Collected: 08/15/24 1219    Specimen: Blood Updated: 08/15/24 1226     WBC 7.90 10*3/mm3      RBC 4.01 10*6/mm3      Hemoglobin 12.0 g/dL      Hematocrit 37.9 %      MCV 94.5 fL      MCH 29.9 pg      MCHC 31.7 g/dL      RDW 15.5 %      RDW-SD 53.8 fl      MPV 10.1 fL      Platelets 232 10*3/mm3      Neutrophil % 66.7 %      Lymphocyte % 25.3 %      Monocyte % 6.5 %      Eosinophil % 0.1 %      Basophil % 0.9 %      Immature Grans % 0.5 %      Neutrophils, Absolute 5.27 10*3/mm3      Lymphocytes, Absolute 2.00 10*3/mm3      Monocytes, Absolute 0.51 10*3/mm3      Eosinophils, Absolute 0.01 10*3/mm3      Basophils, Absolute 0.07 10*3/mm3      Immature Grans, Absolute 0.04 10*3/mm3      nRBC 0.0 /100 WBC           ECG/EMG Results (last 24 hours)       Procedure Component Value Units Date/Time    ECG 12 Lead ED Triage Standing Order; Chest Pain [[id number]] Collected: 08/15/24 1225     Updated: 08/15/24 1334     QT Interval 392 ms      QTC Interval 425 ms     Narrative:      Test Reason : cp  Blood Pressure :   */*   mmHG  Vent. Rate :  71 BPM     Atrial Rate :  71 BPM     P-R Int : 190 ms          QRS Dur :  76 ms      QT Int : 392 ms       P-R-T Axes :  48 -36 228 degrees     QTc Int : 425 ms    Normal sinus rhythm  Left axis deviation  Inferior  infarct , age undetermined  ST & T wave abnormality, consider anterolateral ischemia  Abnormal ECG  No previous ECGs available    Referred By: edmd           Confirmed By:                ASSESSMENT/PLAN:  Anahi Tirado is a frail, 79-year-old female with past medical history listed above who developed chest pressure/nausea after ingestion of oral contrast at outside diagnostic center which resolved with Zofran.  She has had no further chest pain.   EKG with non-specific changes, no clinical evidence or suspicion of ACS.  Coronary CTA in February showed no obstructive CAD or significant plaque.  Case briefly discussed with Dr. Nikolay Jhaveri.    -Will await repeat troponin although symptoms are atypical for cardiac etiology.  -Favor completing GI workup, moderate to large hiatal hernia on x-ray/anorexia/malnutrition.  -Will need echocardiogram prior to or same day as follow up with Dr. Cha.    Fidelia Troy, MIGUEL 08/15/24 14:51 EDT

## 2024-08-15 NOTE — DISCHARGE INSTRUCTIONS
Return if further episodes of chest pain or any other concerns.  Follow-up with Dr. Jhaveri and Dr Khoury.

## 2024-08-15 NOTE — ED PROVIDER NOTES
Subjective   History of Present Illness  Mrs. Tirado is sent from the diagnostic imaging center with chest pain.  She was scheduled for CAT scan with oral contrast.  She tells me she was drinking the contrast and developed a sensation that she needed to burp.  She became nauseous.  She developed pressure over her chest.  She mentioned it to the tech working there and they called an ambulance and sent her here.  She was given Zofran on the way here and tells me she now feels fine.  She denies any prior cardiac history.  She tells me she has been found to have a duodenal ulcer.  Review of her records indicates she had coronary CT angiogram in February of this year that was reassuring.      Review of Systems    Past Medical History:   Diagnosis Date    Arthritis     Hyperlipidemia     Hypothyroidism        Allergies   Allergen Reactions    Codeine Itching    Morphine Rash       Past Surgical History:   Procedure Laterality Date    APPENDECTOMY      BREAST LUMPECTOMY Left     PITUITARY SURGERY         Family History   Problem Relation Age of Onset    Heart disease Mother     Heart disease Father        Social History     Socioeconomic History    Marital status: Single   Tobacco Use    Smoking status: Never    Smokeless tobacco: Never   Vaping Use    Vaping status: Never Used   Substance and Sexual Activity    Alcohol use: Never    Drug use: Never    Sexual activity: Defer           Objective   Physical Exam  Vitals and nursing note reviewed.   Constitutional:       General: She is not in acute distress.     Appearance: Normal appearance.   HENT:      Head: Normocephalic and atraumatic.      Nose: Nose normal. No congestion or rhinorrhea.   Eyes:      General: No scleral icterus.     Conjunctiva/sclera: Conjunctivae normal.      Comments: She has weakness of the right face.   Neck:      Comments: No JVD   Cardiovascular:      Rate and Rhythm: Normal rate and regular rhythm.      Heart sounds: No murmur heard.     No  friction rub.   Pulmonary:      Effort: Pulmonary effort is normal.      Breath sounds: Normal breath sounds. No wheezing or rales.   Abdominal:      General: Bowel sounds are normal.      Palpations: Abdomen is soft.      Tenderness: There is no abdominal tenderness. There is no guarding or rebound.   Musculoskeletal:         General: No tenderness.      Cervical back: Normal range of motion and neck supple.      Right lower leg: No edema.      Left lower leg: No edema.   Skin:     General: Skin is warm and dry.      Coloration: Skin is not pale.      Findings: No erythema.   Neurological:      General: No focal deficit present.      Mental Status: She is alert.      Motor: No weakness.      Coordination: Coordination normal.   Psychiatric:         Mood and Affect: Mood normal.         Behavior: Behavior normal.         Thought Content: Thought content normal.         Procedures           ED Course  ED Course as of 08/15/24 1552   Thu Aug 15, 2024   1242 Review of her records indicates the CT scan was ordered by [facility].  They saw her in June of this year.   [DT]   1244 EKG today shows normal sinus rhythm.  There is inferior and lateral T wave inversion.  I compared this with an EKG from September of last year and the T wave inversion is new.  She is currently pain-free. [DT]   1318 Troponin slightly elevated, heart score is 5, called Dr. Cha's office and requested a consult. [DT]   1459 Dr. Jhaveri. and his colleague has reviewed her EKGs, prior records, current events.  They feel EKG is nonspecific and does not represent acute ischemia.  They recommend discharge as long as second troponin remains reassuring. [DT]   1548 Trop #2 is 24. Conferred w Dr Jhaveri, recommend d/c. [DT]   1551 She is comfortable and cheerful. [DT]      ED Course User Index  [DT] Marcio Avalos MD                HEART Score: 5                              Medical Decision Making  Please see course notes.  Ordered and  interpreted multiple labs including troponins 2 hours apart.  Consulted cardiology and reviewed EKGs    Problems Addressed:  Chest pain, unspecified type: complicated acute illness or injury that poses a threat to life or bodily functions    Amount and/or Complexity of Data Reviewed  Labs: ordered. Decision-making details documented in ED Course.  Radiology: ordered. Decision-making details documented in ED Course.  ECG/medicine tests: ordered. Decision-making details documented in ED Course.    Risk  OTC drugs.  Prescription drug management.        Final diagnoses:   Chest pain, unspecified type       ED Disposition  ED Disposition       ED Disposition   Discharge    Condition   Stable    Comment   --               Tom Khoury MD  [address] [facility] [id number] [phone]          Juan Daniel Cha MD  [address] [id number] [phone]    Follow up in 2 week(s)  with same day echo         Medication List        Changed      predniSONE 5 MG tablet  Commonly known as: DELTASONE  1 each am, 1/2 each pm  What changed:   how much to take  when to take this  additional instructions                 Marcio Avalos MD  08/15/24 4063

## 2024-08-16 ENCOUNTER — TELEPHONE (OUTPATIENT)
Dept: GASTROENTEROLOGY | Facility: CLINIC | Age: 79
End: 2024-08-16
Payer: MEDICARE

## 2024-08-16 DIAGNOSIS — K44.9 PARAESOPHAGEAL HIATAL HERNIA: Primary | ICD-10-CM

## 2024-08-16 NOTE — TELEPHONE ENCOUNTER
Spoke with patient. Her chest pain has resolved.  We discussed her UGI with possible organoaxial rotation.  She feels her chest pain was stemming more from her back and laying back on the hard surface for her exam.   I have placed an urgent referred to gen surg. If her chest pain returns she will return to ED for possible emergent HH repair

## 2024-08-16 NOTE — TELEPHONE ENCOUNTER
Called patient regarding large HH with intrathoracic stomach with organoaxial rotation- LVm to call back ASAP- needs to be directed to ED

## 2024-08-19 ENCOUNTER — TRANSCRIBE ORDERS (OUTPATIENT)
Dept: ADMINISTRATIVE | Facility: HOSPITAL | Age: 79
End: 2024-08-19
Payer: MEDICARE

## 2024-08-19 DIAGNOSIS — K44.9 DIAPHRAGMATIC HERNIA WITHOUT OBSTRUCTION OR GANGRENE: Primary | ICD-10-CM

## 2024-10-03 ENCOUNTER — HOSPITAL ENCOUNTER (OUTPATIENT)
Dept: CARDIOLOGY | Facility: HOSPITAL | Age: 79
Discharge: HOME | End: 2024-10-03
Payer: MEDICARE

## 2024-10-03 VITALS
WEIGHT: 100.31 LBS | SYSTOLIC BLOOD PRESSURE: 112 MMHG | DIASTOLIC BLOOD PRESSURE: 86 MMHG | BODY MASS INDEX: 16.71 KG/M2 | HEIGHT: 65 IN

## 2024-10-03 DIAGNOSIS — R07.9 CHEST PAIN, UNSPECIFIED TYPE: ICD-10-CM

## 2024-10-03 LAB
ASCENDING AORTA: 3.2 CM
BH CV ECHO MEAS - AI P1/2T: 1113 MSEC
BH CV ECHO MEAS - AO MAX PG: 3.5 MMHG
BH CV ECHO MEAS - AO MEAN PG: 2 MMHG
BH CV ECHO MEAS - AO ROOT DIAM: 3.5 CM
BH CV ECHO MEAS - AO V2 MAX: 94.1 CM/SEC
BH CV ECHO MEAS - AO V2 VTI: 18.9 CM
BH CV ECHO MEAS - AVA(I,D): 1.72 CM2
BH CV ECHO MEAS - EDV(CUBED): 22.8 ML
BH CV ECHO MEAS - EDV(MOD-SP2): 43.7 ML
BH CV ECHO MEAS - EDV(MOD-SP4): 49.3 ML
BH CV ECHO MEAS - EF(MOD-BP): 46.3 %
BH CV ECHO MEAS - EF(MOD-SP2): 47.6 %
BH CV ECHO MEAS - EF(MOD-SP4): 46.2 %
BH CV ECHO MEAS - ESV(CUBED): 5.8 ML
BH CV ECHO MEAS - ESV(MOD-SP2): 22.9 ML
BH CV ECHO MEAS - ESV(MOD-SP4): 26.5 ML
BH CV ECHO MEAS - FS: 36.5 %
BH CV ECHO MEAS - IVS/LVPW: 1.01 CM
BH CV ECHO MEAS - IVSD: 0.9 CM
BH CV ECHO MEAS - LA DIMENSION: 1.9 CM
BH CV ECHO MEAS - LAT PEAK E' VEL: 5.7 CM/SEC
BH CV ECHO MEAS - LV DIASTOLIC VOL/BSA (35-75): 33.5 CM2
BH CV ECHO MEAS - LV MASS(C)D: 63.5 GRAMS
BH CV ECHO MEAS - LV MAX PG: 1.55 MMHG
BH CV ECHO MEAS - LV MEAN PG: 1 MMHG
BH CV ECHO MEAS - LV SYSTOLIC VOL/BSA (12-30): 18 CM2
BH CV ECHO MEAS - LV V1 MAX: 62.3 CM/SEC
BH CV ECHO MEAS - LV V1 VTI: 12.8 CM
BH CV ECHO MEAS - LVIDD: 2.8 CM
BH CV ECHO MEAS - LVIDS: 1.8 CM
BH CV ECHO MEAS - LVOT AREA: 2.5 CM2
BH CV ECHO MEAS - LVOT DIAM: 1.8 CM
BH CV ECHO MEAS - LVPWD: 0.89 CM
BH CV ECHO MEAS - MED PEAK E' VEL: 4.2 CM/SEC
BH CV ECHO MEAS - MV A MAX VEL: 68.7 CM/SEC
BH CV ECHO MEAS - MV DEC SLOPE: 255 CM/SEC2
BH CV ECHO MEAS - MV DEC TIME: 0.25 SEC
BH CV ECHO MEAS - MV E MAX VEL: 59.7 CM/SEC
BH CV ECHO MEAS - MV E/A: 0.87
BH CV ECHO MEAS - MV MAX PG: 2.13 MMHG
BH CV ECHO MEAS - MV MEAN PG: 1 MMHG
BH CV ECHO MEAS - MV P1/2T: 86.6 MSEC
BH CV ECHO MEAS - MV V2 VTI: 22.4 CM
BH CV ECHO MEAS - MVA(P1/2T): 2.5 CM2
BH CV ECHO MEAS - MVA(VTI): 1.45 CM2
BH CV ECHO MEAS - PA ACC TIME: 0.1 SEC
BH CV ECHO MEAS - RAP SYSTOLE: 8 MMHG
BH CV ECHO MEAS - RVSP: 23 MMHG
BH CV ECHO MEAS - SV(LVOT): 32.6 ML
BH CV ECHO MEAS - SV(MOD-SP2): 20.8 ML
BH CV ECHO MEAS - SV(MOD-SP4): 22.8 ML
BH CV ECHO MEAS - SVI(LVOT): 22.1 ML/M2
BH CV ECHO MEAS - SVI(MOD-SP2): 14.1 ML/M2
BH CV ECHO MEAS - SVI(MOD-SP4): 15.5 ML/M2
BH CV ECHO MEAS - TAPSE (>1.6): 1.5 CM
BH CV ECHO MEAS - TR MAX PG: 15.4 MMHG
BH CV ECHO MEAS - TR MAX VEL: 174.2 CM/SEC
BH CV ECHO MEASUREMENTS AVERAGE E/E' RATIO: 12.06
BH CV XLRA - RV BASE: 2.6 CM
BH CV XLRA - RV LENGTH: 5.2 CM
BH CV XLRA - RV MID: 1.9 CM
BH CV XLRA - TDI S': 6.9 CM/SEC

## 2024-10-03 PROCEDURE — 93306 TTE W/DOPPLER COMPLETE: CPT

## 2024-11-08 ENCOUNTER — OFFICE VISIT (OUTPATIENT)
Age: 79
End: 2024-11-08
Payer: MEDICARE

## 2024-11-08 VITALS — SYSTOLIC BLOOD PRESSURE: 114 MMHG | HEART RATE: 61 BPM | DIASTOLIC BLOOD PRESSURE: 74 MMHG | OXYGEN SATURATION: 96 %

## 2024-11-08 DIAGNOSIS — E23.0 PANHYPOPITUITARISM: Primary | ICD-10-CM

## 2024-11-08 PROCEDURE — 99213 OFFICE O/P EST LOW 20 MIN: CPT | Performed by: INTERNAL MEDICINE

## 2024-11-08 NOTE — PROGRESS NOTES
Office Note      Date: 2024  Patient Name: Anahi Tirado  MRN: [id number]  : 1945    Chief Complaint   Patient presents with    Panhypopituitarism       History of Present Illness:   Anahi Tirado is a 79 y.o. female who presents for Panhypopituitarism  .  From remote resection of pituitary tumor   Current rx: prednison and levothyroxine    Changes in history:she was in the hospital with a GI bleed.. she is currently in a nursing home.  She thinks her dose of prednisone has been increased to 10 mg per day  Her thyroid dose is the same   Questions /problems:wants results sent to dr belcher    Labs : free t4 is normal  Tsh was low  Cmp was ok     Subjective          Review of Systems:   Review of Systems   Constitutional:  Negative for unexpected weight change.   Neurological:         Had a bad fall and then had some visual hallucinations afterwards        The following portions of the patient's history were reviewed and updated as appropriate: allergies, current medications, past family history, past medical history, past social history, past surgical history, and problem list.    Objective     Visit Vitals  /74 (BP Location: Right arm, Patient Position: Sitting, Cuff Size: Adult)   Pulse 61   SpO2 96%           Physical Exam:  Physical Exam  Vitals reviewed.   Constitutional:       Appearance: Normal appearance.   Neurological:      Mental Status: She is alert.   Psychiatric:         Mood and Affect: Mood normal.         Behavior: Behavior normal.         Thought Content: Thought content normal.         Judgment: Judgment normal.       Assessment / Plan      Assessment & Plan:  Problem List Items Addressed This Visit       Panhypopituitarism - Primary    Overview     Central hypot4. 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          Relevant Medications    predniSONE (DELTASONE) 5 MG tablet    levothyroxine (SYNTHROID, LEVOTHROID) 50 MCG tablet        Electronically signed by : Leon Toscano MD   11/08/2024